# Patient Record
Sex: FEMALE | Race: WHITE | Employment: OTHER | URBAN - METROPOLITAN AREA
[De-identification: names, ages, dates, MRNs, and addresses within clinical notes are randomized per-mention and may not be internally consistent; named-entity substitution may affect disease eponyms.]

---

## 2022-07-20 ENCOUNTER — ANESTHESIA (OUTPATIENT)
Dept: OPERATING ROOM | Age: 62
End: 2022-07-20
Payer: COMMERCIAL

## 2022-07-20 ENCOUNTER — HOSPITAL ENCOUNTER (OUTPATIENT)
Dept: GENERAL RADIOLOGY | Age: 62
Discharge: HOME | End: 2022-07-22
Attending: ORTHOPAEDIC SURGERY
Payer: COMMERCIAL

## 2022-07-20 ENCOUNTER — ANESTHESIA EVENT (OUTPATIENT)
Dept: OPERATING ROOM | Age: 62
End: 2022-07-20
Payer: COMMERCIAL

## 2022-07-20 ENCOUNTER — HOSPITAL ENCOUNTER (OUTPATIENT)
Age: 62
Setting detail: OUTPATIENT SURGERY
Discharge: HOME | End: 2022-07-20
Attending: ORTHOPAEDIC SURGERY | Admitting: ORTHOPAEDIC SURGERY
Payer: COMMERCIAL

## 2022-07-20 VITALS
OXYGEN SATURATION: 94 % | DIASTOLIC BLOOD PRESSURE: 66 MMHG | HEART RATE: 74 BPM | WEIGHT: 130 LBS | BODY MASS INDEX: 24.55 KG/M2 | TEMPERATURE: 97.4 F | SYSTOLIC BLOOD PRESSURE: 123 MMHG | HEIGHT: 61 IN | RESPIRATION RATE: 16 BRPM

## 2022-07-20 DIAGNOSIS — S52.502A CLOSED FRACTURE OF DISTAL END OF LEFT RADIUS, UNSPECIFIED FRACTURE MORPHOLOGY, INITIAL ENCOUNTER: ICD-10-CM

## 2022-07-20 DIAGNOSIS — R52 PAIN: ICD-10-CM

## 2022-07-20 DIAGNOSIS — G56.02 LEFT CARPAL TUNNEL SYNDROME: ICD-10-CM

## 2022-07-20 LAB
ALBUMIN SERPL-MCNC: 4.9 G/DL (ref 3.5–4.6)
ALP BLD-CCNC: 141 U/L (ref 40–130)
ALT SERPL-CCNC: 13 U/L (ref 0–33)
ANION GAP SERPL CALCULATED.3IONS-SCNC: 15 MEQ/L (ref 9–15)
AST SERPL-CCNC: 18 U/L (ref 0–35)
BILIRUB SERPL-MCNC: 0.7 MG/DL (ref 0.2–0.7)
BUN BLDV-MCNC: 26 MG/DL (ref 8–23)
CALCIUM SERPL-MCNC: 10 MG/DL (ref 8.5–9.9)
CHLORIDE BLD-SCNC: 100 MEQ/L (ref 95–107)
CO2: 23 MEQ/L (ref 20–31)
CREAT SERPL-MCNC: 0.69 MG/DL (ref 0.5–0.9)
GFR AFRICAN AMERICAN: >60
GFR NON-AFRICAN AMERICAN: >60
GLOBULIN: 2.5 G/DL (ref 2.3–3.5)
GLUCOSE BLD-MCNC: 83 MG/DL (ref 70–99)
HCT VFR BLD CALC: 41.7 % (ref 37–47)
HEMOGLOBIN: 14.1 G/DL (ref 12–16)
MCH RBC QN AUTO: 30 PG (ref 27–31.3)
MCHC RBC AUTO-ENTMCNC: 33.8 % (ref 33–37)
MCV RBC AUTO: 88.6 FL (ref 82–100)
PDW BLD-RTO: 13.9 % (ref 11.5–14.5)
PLATELET # BLD: 314 K/UL (ref 130–400)
POTASSIUM SERPL-SCNC: 4.3 MEQ/L (ref 3.4–4.9)
RBC # BLD: 4.71 M/UL (ref 4.2–5.4)
SODIUM BLD-SCNC: 138 MEQ/L (ref 135–144)
TOTAL PROTEIN: 7.4 G/DL (ref 6.3–8)
WBC # BLD: 6.2 K/UL (ref 4.8–10.8)

## 2022-07-20 PROCEDURE — 2580000003 HC RX 258: Performed by: STUDENT IN AN ORGANIZED HEALTH CARE EDUCATION/TRAINING PROGRAM

## 2022-07-20 PROCEDURE — 2720000010 HC SURG SUPPLY STERILE: Performed by: ORTHOPAEDIC SURGERY

## 2022-07-20 PROCEDURE — 88342 IMHCHEM/IMCYTCHM 1ST ANTB: CPT

## 2022-07-20 PROCEDURE — 64417 NJX AA&/STRD AX NERVE IMG: CPT | Performed by: STUDENT IN AN ORGANIZED HEALTH CARE EDUCATION/TRAINING PROGRAM

## 2022-07-20 PROCEDURE — 3700000001 HC ADD 15 MINUTES (ANESTHESIA): Performed by: ORTHOPAEDIC SURGERY

## 2022-07-20 PROCEDURE — 2500000003 HC RX 250 WO HCPCS: Performed by: STUDENT IN AN ORGANIZED HEALTH CARE EDUCATION/TRAINING PROGRAM

## 2022-07-20 PROCEDURE — 93005 ELECTROCARDIOGRAM TRACING: CPT | Performed by: ORTHOPAEDIC SURGERY

## 2022-07-20 PROCEDURE — 2580000003 HC RX 258: Performed by: NURSE ANESTHETIST, CERTIFIED REGISTERED

## 2022-07-20 PROCEDURE — 2709999900 HC NON-CHARGEABLE SUPPLY: Performed by: ORTHOPAEDIC SURGERY

## 2022-07-20 PROCEDURE — 3700000000 HC ANESTHESIA ATTENDED CARE: Performed by: ORTHOPAEDIC SURGERY

## 2022-07-20 PROCEDURE — 7100000001 HC PACU RECOVERY - ADDTL 15 MIN: Performed by: ORTHOPAEDIC SURGERY

## 2022-07-20 PROCEDURE — 6360000002 HC RX W HCPCS: Performed by: ORTHOPAEDIC SURGERY

## 2022-07-20 PROCEDURE — 88311 DECALCIFY TISSUE: CPT

## 2022-07-20 PROCEDURE — 88305 TISSUE EXAM BY PATHOLOGIST: CPT

## 2022-07-20 PROCEDURE — 88341 IMHCHEM/IMCYTCHM EA ADD ANTB: CPT

## 2022-07-20 PROCEDURE — 7100000000 HC PACU RECOVERY - FIRST 15 MIN: Performed by: ORTHOPAEDIC SURGERY

## 2022-07-20 PROCEDURE — A4217 STERILE WATER/SALINE, 500 ML: HCPCS | Performed by: ORTHOPAEDIC SURGERY

## 2022-07-20 PROCEDURE — 3600000013 HC SURGERY LEVEL 3 ADDTL 15MIN: Performed by: ORTHOPAEDIC SURGERY

## 2022-07-20 PROCEDURE — 7100000010 HC PHASE II RECOVERY - FIRST 15 MIN: Performed by: ORTHOPAEDIC SURGERY

## 2022-07-20 PROCEDURE — 6360000002 HC RX W HCPCS: Performed by: STUDENT IN AN ORGANIZED HEALTH CARE EDUCATION/TRAINING PROGRAM

## 2022-07-20 PROCEDURE — 3209999900 FLUORO FOR SURGICAL PROCEDURES

## 2022-07-20 PROCEDURE — C1713 ANCHOR/SCREW BN/BN,TIS/BN: HCPCS | Performed by: ORTHOPAEDIC SURGERY

## 2022-07-20 PROCEDURE — 3600000003 HC SURGERY LEVEL 3 BASE: Performed by: ORTHOPAEDIC SURGERY

## 2022-07-20 PROCEDURE — 6360000002 HC RX W HCPCS: Performed by: NURSE ANESTHETIST, CERTIFIED REGISTERED

## 2022-07-20 PROCEDURE — 85027 COMPLETE CBC AUTOMATED: CPT

## 2022-07-20 PROCEDURE — 80053 COMPREHEN METABOLIC PANEL: CPT

## 2022-07-20 PROCEDURE — 2580000003 HC RX 258: Performed by: ORTHOPAEDIC SURGERY

## 2022-07-20 PROCEDURE — 7100000011 HC PHASE II RECOVERY - ADDTL 15 MIN: Performed by: ORTHOPAEDIC SURGERY

## 2022-07-20 DEVICE — SCREW BNE L14MM DIA2.4MM DST RAD VOLAR S STL ST VAR ANG LOK: Type: IMPLANTABLE DEVICE | Site: RADIUS | Status: FUNCTIONAL

## 2022-07-20 DEVICE — K WIRE FIX L150MM DIA1.6MM S STL TRCR PNT: Type: IMPLANTABLE DEVICE | Site: RADIUS | Status: FUNCTIONAL

## 2022-07-20 DEVICE — SCREW BNE L10MM DIA2.4MM S STL ST VAR ANG LOK FULL THRD T8: Type: IMPLANTABLE DEVICE | Site: WRIST | Status: FUNCTIONAL

## 2022-07-20 DEVICE — PLATE BNE L45MM 6X2 H L VOLAR DST RAD S STL VAR ANG LOK: Type: IMPLANTABLE DEVICE | Site: RADIUS | Status: FUNCTIONAL

## 2022-07-20 DEVICE — SCREW BNE L18MM DIA2.4MM DST RAD VOLAR S STL ST VAR ANG LOK: Type: IMPLANTABLE DEVICE | Site: WRIST | Status: FUNCTIONAL

## 2022-07-20 DEVICE — SCREW BNE L18MM DIA2.4MM S STL ST LOK FULL THRD T8 STARDRV: Type: IMPLANTABLE DEVICE | Site: WRIST | Status: FUNCTIONAL

## 2022-07-20 DEVICE — IMPLANTABLE DEVICE: Type: IMPLANTABLE DEVICE | Site: WRIST | Status: FUNCTIONAL

## 2022-07-20 DEVICE — SCREW BNE L16MM DIA2.4MM DST RAD VOLAR S STL ST VAR ANG LOK: Type: IMPLANTABLE DEVICE | Site: RADIUS | Status: FUNCTIONAL

## 2022-07-20 DEVICE — SCREW BNE L12MM DIA24MM CORT S STL ST T8 STARDRV RECESS: Type: IMPLANTABLE DEVICE | Site: RADIUS | Status: FUNCTIONAL

## 2022-07-20 RX ORDER — SIMVASTATIN 40 MG
40 TABLET ORAL NIGHTLY
COMMUNITY

## 2022-07-20 RX ORDER — BUPIVACAINE HYDROCHLORIDE 5 MG/ML
INJECTION, SOLUTION EPIDURAL; INTRACAUDAL
Status: COMPLETED | OUTPATIENT
Start: 2022-07-20 | End: 2022-07-20

## 2022-07-20 RX ORDER — SODIUM CHLORIDE, SODIUM LACTATE, POTASSIUM CHLORIDE, CALCIUM CHLORIDE 600; 310; 30; 20 MG/100ML; MG/100ML; MG/100ML; MG/100ML
INJECTION, SOLUTION INTRAVENOUS CONTINUOUS PRN
Status: DISCONTINUED | OUTPATIENT
Start: 2022-07-20 | End: 2022-07-20 | Stop reason: SDUPTHER

## 2022-07-20 RX ORDER — VENLAFAXINE 100 MG/1
100 TABLET ORAL 2 TIMES DAILY
COMMUNITY

## 2022-07-20 RX ORDER — MAGNESIUM HYDROXIDE 1200 MG/15ML
LIQUID ORAL CONTINUOUS PRN
Status: DISCONTINUED | OUTPATIENT
Start: 2022-07-20 | End: 2022-07-20 | Stop reason: HOSPADM

## 2022-07-20 RX ORDER — ONDANSETRON 2 MG/ML
INJECTION INTRAMUSCULAR; INTRAVENOUS PRN
Status: DISCONTINUED | OUTPATIENT
Start: 2022-07-20 | End: 2022-07-20 | Stop reason: SDUPTHER

## 2022-07-20 RX ORDER — METOCLOPRAMIDE HYDROCHLORIDE 5 MG/ML
10 INJECTION INTRAMUSCULAR; INTRAVENOUS
Status: DISCONTINUED | OUTPATIENT
Start: 2022-07-20 | End: 2022-07-20 | Stop reason: HOSPADM

## 2022-07-20 RX ORDER — SODIUM CHLORIDE, SODIUM LACTATE, POTASSIUM CHLORIDE, CALCIUM CHLORIDE 600; 310; 30; 20 MG/100ML; MG/100ML; MG/100ML; MG/100ML
INJECTION, SOLUTION INTRAVENOUS CONTINUOUS
Status: DISCONTINUED | OUTPATIENT
Start: 2022-07-20 | End: 2022-07-20 | Stop reason: HOSPADM

## 2022-07-20 RX ORDER — OXYCODONE HYDROCHLORIDE 5 MG/1
5 TABLET ORAL PRN
Status: DISCONTINUED | OUTPATIENT
Start: 2022-07-20 | End: 2022-07-20 | Stop reason: HOSPADM

## 2022-07-20 RX ORDER — LIDOCAINE HYDROCHLORIDE 10 MG/ML
1 INJECTION, SOLUTION EPIDURAL; INFILTRATION; INTRACAUDAL; PERINEURAL
Status: DISCONTINUED | OUTPATIENT
Start: 2022-07-20 | End: 2022-07-20 | Stop reason: HOSPADM

## 2022-07-20 RX ORDER — OXYCODONE AND ACETAMINOPHEN 10; 325 MG/1; MG/1
1 TABLET ORAL EVERY 4 HOURS PRN
COMMUNITY

## 2022-07-20 RX ORDER — LIDOCAINE HYDROCHLORIDE 10 MG/ML
1 INJECTION, SOLUTION EPIDURAL; INFILTRATION; INTRACAUDAL; PERINEURAL ONCE
Status: DISCONTINUED | OUTPATIENT
Start: 2022-07-20 | End: 2022-07-20 | Stop reason: HOSPADM

## 2022-07-20 RX ORDER — DIPHENHYDRAMINE HYDROCHLORIDE 50 MG/ML
12.5 INJECTION INTRAMUSCULAR; INTRAVENOUS
Status: DISCONTINUED | OUTPATIENT
Start: 2022-07-20 | End: 2022-07-20 | Stop reason: HOSPADM

## 2022-07-20 RX ORDER — MIDAZOLAM HYDROCHLORIDE 1 MG/ML
INJECTION INTRAMUSCULAR; INTRAVENOUS PRN
Status: DISCONTINUED | OUTPATIENT
Start: 2022-07-20 | End: 2022-07-20 | Stop reason: SDUPTHER

## 2022-07-20 RX ORDER — OXYCODONE HYDROCHLORIDE 5 MG/1
10 TABLET ORAL PRN
Status: DISCONTINUED | OUTPATIENT
Start: 2022-07-20 | End: 2022-07-20 | Stop reason: HOSPADM

## 2022-07-20 RX ORDER — FENTANYL CITRATE 50 UG/ML
50 INJECTION, SOLUTION INTRAMUSCULAR; INTRAVENOUS EVERY 10 MIN PRN
Status: DISCONTINUED | OUTPATIENT
Start: 2022-07-20 | End: 2022-07-20 | Stop reason: HOSPADM

## 2022-07-20 RX ORDER — FENTANYL CITRATE 50 UG/ML
INJECTION, SOLUTION INTRAMUSCULAR; INTRAVENOUS PRN
Status: DISCONTINUED | OUTPATIENT
Start: 2022-07-20 | End: 2022-07-20 | Stop reason: SDUPTHER

## 2022-07-20 RX ORDER — MEPERIDINE HYDROCHLORIDE 25 MG/ML
12.5 INJECTION INTRAMUSCULAR; INTRAVENOUS; SUBCUTANEOUS
Status: DISCONTINUED | OUTPATIENT
Start: 2022-07-20 | End: 2022-07-20 | Stop reason: HOSPADM

## 2022-07-20 RX ORDER — DEXAMETHASONE SODIUM PHOSPHATE 10 MG/ML
INJECTION INTRAMUSCULAR; INTRAVENOUS PRN
Status: DISCONTINUED | OUTPATIENT
Start: 2022-07-20 | End: 2022-07-20 | Stop reason: SDUPTHER

## 2022-07-20 RX ORDER — LIDOCAINE HYDROCHLORIDE 20 MG/ML
INJECTION, SOLUTION INTRAVENOUS PRN
Status: DISCONTINUED | OUTPATIENT
Start: 2022-07-20 | End: 2022-07-20 | Stop reason: SDUPTHER

## 2022-07-20 RX ORDER — ONDANSETRON 2 MG/ML
4 INJECTION INTRAMUSCULAR; INTRAVENOUS
Status: DISCONTINUED | OUTPATIENT
Start: 2022-07-20 | End: 2022-07-20 | Stop reason: HOSPADM

## 2022-07-20 RX ORDER — PROPOFOL 10 MG/ML
INJECTION, EMULSION INTRAVENOUS PRN
Status: DISCONTINUED | OUTPATIENT
Start: 2022-07-20 | End: 2022-07-20 | Stop reason: SDUPTHER

## 2022-07-20 RX ADMIN — LIDOCAINE HYDROCHLORIDE 50 MG: 20 INJECTION, SOLUTION INTRAVENOUS at 12:13

## 2022-07-20 RX ADMIN — DEXAMETHASONE SODIUM PHOSPHATE 10 MG: 10 INJECTION INTRAMUSCULAR; INTRAVENOUS at 12:38

## 2022-07-20 RX ADMIN — MIDAZOLAM HYDROCHLORIDE 2 MG: 1 INJECTION, SOLUTION INTRAMUSCULAR; INTRAVENOUS at 12:10

## 2022-07-20 RX ADMIN — MIDAZOLAM HYDROCHLORIDE 2 MG: 1 INJECTION, SOLUTION INTRAMUSCULAR; INTRAVENOUS at 11:16

## 2022-07-20 RX ADMIN — PROPOFOL 200 MG: 10 INJECTION, EMULSION INTRAVENOUS at 12:13

## 2022-07-20 RX ADMIN — SODIUM CHLORIDE, POTASSIUM CHLORIDE, SODIUM LACTATE AND CALCIUM CHLORIDE: 600; 310; 30; 20 INJECTION, SOLUTION INTRAVENOUS at 11:47

## 2022-07-20 RX ADMIN — FENTANYL CITRATE 25 MCG: 50 INJECTION, SOLUTION INTRAMUSCULAR; INTRAVENOUS at 13:09

## 2022-07-20 RX ADMIN — ONDANSETRON 4 MG: 2 INJECTION INTRAMUSCULAR; INTRAVENOUS at 13:53

## 2022-07-20 RX ADMIN — BUPIVACAINE HYDROCHLORIDE 30 ML: 5 INJECTION, SOLUTION EPIDURAL; INTRACAUDAL at 11:18

## 2022-07-20 RX ADMIN — SODIUM CHLORIDE, POTASSIUM CHLORIDE, SODIUM LACTATE AND CALCIUM CHLORIDE: 600; 310; 30; 20 INJECTION, SOLUTION INTRAVENOUS at 11:00

## 2022-07-20 RX ADMIN — FENTANYL CITRATE 50 MCG: 50 INJECTION, SOLUTION INTRAMUSCULAR; INTRAVENOUS at 12:13

## 2022-07-20 RX ADMIN — CEFAZOLIN 2000 MG: 10 INJECTION, POWDER, FOR SOLUTION INTRAVENOUS at 12:16

## 2022-07-20 ASSESSMENT — PAIN - FUNCTIONAL ASSESSMENT
PAIN_FUNCTIONAL_ASSESSMENT: 0-10
PAIN_FUNCTIONAL_ASSESSMENT: ACTIVITIES ARE NOT PREVENTED

## 2022-07-20 ASSESSMENT — PAIN DESCRIPTION - DESCRIPTORS: DESCRIPTORS: ACHING

## 2022-07-20 NOTE — ANESTHESIA PROCEDURE NOTES
Peripheral Block    Patient location during procedure: pre-op  Reason for block: post-op pain management and at surgeon's request  Start time: 7/20/2022 11:18 AM  End time: 7/20/2022 11:24 AM  Staffing  Performed: anesthesiologist   Anesthesiologist: Orlando Krabbe, DO  Preanesthetic Checklist  Completed: patient identified, IV checked, site marked, risks and benefits discussed, surgical/procedural consents, equipment checked, pre-op evaluation, timeout performed, anesthesia consent given, oxygen available and monitors applied/VS acknowledged  Peripheral Block   Patient position: supine  Prep: ChloraPrep  Provider prep: mask and sterile gloves (Sterile probe cover)  Patient monitoring: cardiac monitor, continuous pulse ox, frequent blood pressure checks and IV access  Block type: Axillary  L axillary  Laterality: left  Injection technique: single-shot  Guidance: nerve stimulator and ultrasound guided  Local infiltration: bupivacaine  Infiltration strength: 0.5 %  Local infiltration: bupivacaine  Dose: 30 mL    Needle   Needle type: combined needle/nerve stimulator   Needle gauge: 22 G  Needle localization: anatomical landmarks, ultrasound guidance and nerve stimulator  Needle length: 10 cm  Assessment   Injection assessment: negative aspiration for heme, no paresthesia on injection and local visualized surrounding nerve on ultrasound  Paresthesia pain: immediately resolved  Slow fractionated injection: yes  Hemodynamics: stable  Real-time US image taken/store: yes    Additional Notes  Ultrasound image printed and saved in patient chart.     Sterile probe cover used    Medications Administered  bupivacaine (PF) 0.5 % - Perineural   30 mL - 7/20/2022 11:18:00 AM

## 2022-07-20 NOTE — ANESTHESIA POSTPROCEDURE EVALUATION
Department of Anesthesiology  Postprocedure Note    Patient: Herb Bhat  MRN: [id number]  YOB: 1960  Date of evaluation: 7/20/2022      Procedure Summary     Date: 07/20/22 Room / Location: [address]    Anesthesia Start: 1210 Anesthesia Stop: 1997    Procedure: OPEN REDUCTION INTERNAL FIXATION LEFT DISTAL RADIUS FRACTURE WITH OPEN SCAPULUNATE LIGAMENT REPAIR, OPEN BIOPSY OF LEFT ULNA (Left: Wrist) Diagnosis:       Left carpal tunnel syndrome      Closed fracture of distal end of left radius, unspecified fracture morphology, initial encounter      (LEFT CTS, LT DISTAL RADIUS FX, LEFT WRIST SCAPOLUNATE LIGAMENT DISRUPTION LEFT ULNA BONE LESION)    Surgeons: Kristian Ulloa DO Responsible Provider: Cade Phipps DO    Anesthesia Type: general, regional ASA Status: 1          Anesthesia Type: No value filed.     Mei Phase I: Mei Score: 10    Mei Phase II:        Anesthesia Post Evaluation    Patient location during evaluation: PACU  Patient participation: complete - patient participated  Level of consciousness: awake  Pain score: 0  Airway patency: patent  Nausea & Vomiting: no nausea and no vomiting  Complications: no  Cardiovascular status: hemodynamically stable  Respiratory status: acceptable  Hydration status: euvolemic

## 2022-07-20 NOTE — OP NOTE
Operative Note      Patient: Beth Ricardo  YOB: 1960  MRN: [id number]    Date of Procedure: 7/20/2022      Preoperative diagnosis: Comminuted intra-articular fracture left distal radius. Left wrist scapholunate ligament disruption. Lesion left distal ulna. Postoperative diagnosis: Comminuted intra-articular fracture left distal radius. Left wrist scapholunate ligament rupture, partial.  Lesion of left distal ulna. Procedure planned: Open treatment left distal radius fracture. Open biopsy left distal ulna. Possible scapholunate ligament repair left wrist.    Procedure performed:Open reduction with internal fixation to comminuted intra-articular fracture left distal radius, 3+ part. Left wrist denervation procedure with excision of terminal branch of posterior interosseous nerve for purposes of long-term pain control. Open repair left wrist scapholunate ligament disruption. Biopsy of bone left distal ulna. Surgeon: Mami Mosqueda D.O. Assistant: PEYTON Naqvi  The physician assistant was present through the entire case. Given the nature of the disease process and the procedure to be performed a skilled surgical assistant was necessary during the case. The assistant was necessary in order to hold retractors and directly assist in the operation. A certified scrub tech was at the back table managing instruments and supplies for the surgical case. Anesthesia: Regional block plus general anesthesia    Estimated blood loss: Less than 50 cc. Drains: None    Tourniquet: Less than 1 hour at 250 mmHg to the well-padded left upper arm    Specimens: 2 separate bone plugs harvested with Jamshidi needle were taken from the distal ulna and sent for pathology    Implants: None    Indications:  The patient presented to the office status post injury to the left wrist.  X-rays and CAT scan demonstrated comminuted intra-articular fracture of left distal radius with widening of scapholunate interval.  It was incidental finding of lucent lesion in the distal ulna noted on CAT scan. Treatment options were discussed including both operative and nonoperative strategies. The patient elected to proceed forth by way of open treatment of the distal radius fracture with possible scapholunate ligament repair with biopsy of bone of distal ulna and procedures as indicated. Informed consent was signed and placed in the chart. Complications: None noted at the time of surgery     Description of operation: The patient was taken to the operative suite and placed in the supine position on the operating table. A timeout was performed and the left wrist confirmed to be the operative site. She was carefully positioned on the table in such a fashion as to pad all bony prominences and peripheral nerves. She was administered appropriate IV antibiotics and general anesthesia augment the regional block placed in the preoperative holding area. She was prepped and draped in the normal sterile fashion. A 15 blade was used to create a dorsal midline wrist incision as our initial intention was for a dorsal plating alone construct. The dorsal approach would allow for exposure of the scapholunate ligament. The 15 blade was used to incise skin. Dissection was carried through the subcutaneous plane elevating full-thickness subcutaneous flaps off of the dorsal wrist extensor retinaculum. The third dorsal compartment was then opened and the EPL tendon reflected radially. The second and fourth dorsal compartments were opened and there tendons retracted both radially and ulnarly. There was obvious dorsal comminution about the distal radius. A 15 blade was used to incise the dorsal wrist capsule to allow for direct inspection of the scapholunate ligament. There was obvious partial disruption of the scapholunate ligament including the most inferior and palmar aspects.   The dorsal band was only partially ruptured. There was a hemorrhagic change within this zone confirming acuteness of injury. Given the complexity of the intra-articular distal radius fracture and the obvious scapholunate ligament disruption it was deemed most appropriate to perform denervation procedure for purposes of long-term pain control. The 15 blade was used to excise the distal 1.5 cm of the posterior interosseous nerve. Again this was done for purposes of denervation and long-term pain control. With the aid of fluoroscopy manipulation was undertaken to the distal radius. With a dorsal to volar pressure we were able to achieve good restoration of the combination and articular congruence dorsally but the articular block was noted to translate volarly indicating dorsal plating alone to be insufficient. It was deemed most appropriate to perform a dual approach. The 15 blade was then used to incise skin over the distal course of the flexor carpi radialis tendon. Dissection was carried through the subcutaneous plane and tenotomy scissors. The the volar sheath of the FCR tendon was incised. The FCR tendon was retracted ulnarly and the 15 blade used to incise the dorsal sheath. A finger sweep maneuver was used to clear the FPL muscle belly and fat plane exposing the pronator quadratus. The pronator was sharply released off of its attachment to the distal radius. The appropriate plate was then chosen from the [employer] set and coupled to bone in the shaft segment. Reduction maneuvers were performed to allow good alignment of the volar cortex prior to the plating. A series of lock screws were then placed in the distal segment ensuring that the screws stayed short of their typical length such that they would not serve as an impediment to reduction for the dorsal fragments. We were unable to simultaneously control the profound dorsal comminution through a volar approach and plating alone.   A second cortical screw was placed in the shaft segment finalizing the volar portion of the construct and attention turned again dorsally. The appropriate dorsal plate was coupled to bone with a bicortical screw. 3 additional locking screws were placed in the shaft segment of the plate designed to sit in the intermediate column. This allowed for nice compression of the articular comminution and restoration of articular congruence. The number of articular fragments numbered much more than 3. Attention was then turned to the scapholunate ligament. The scapholunate interval was reduced and compressed with a bone reduction forceps. While holding this reduction a 6 2 K wire was started in the radial aspect of the scaphoid waist and delivered from radial to ulnar and slightly from distal to proximal traversing the scapholunate interval and anchoring bicortically into the lunate. In a similar fashion a second pin was placed at this time with a slightly more proximal and dorsal start point and delivered in a similar trajectory again traversing the scaphoid bicortical into the scapholunate interval and anchoring bicortically into the lunate. These scapholunate ligament pins were cut in a subcutaneous position. Irrigation was performed both the dorsal and volar wounds. The dorsal wounds were then closed using 0 Vicryl for the dorsal wrist capsule and for the dorsal wrist extensor retinaculum. The EPL tendon was left superficial to the retinacular closure. The tourniquet was relieved and hemostasis confirmed both dorsally and volarly. Interrupted Vicryl sutures were used to close the subcutaneous plane and nylon stitches for the skin. Attention was then turned to open biopsy of the distal ulna. The 15 blade was used to incise skin over the palpable distal ulna exploiting the interval between ECU and FCU. Dissection was carried down to the level of the ulnar neck.   Standard techniques were used to place a Jamshidi needle through the zone of interest demonstrated on CAT scan. 2 separate bone plugs were harvested from the distal ulna and sent for pathology. Irrigation was performed to the distal ulna wound and hemostasis confirmed. Wounds were closed in a layered fashion using Vicryl and nylon suture. The patient was then placed into a bulky soft nonadherent dressing and splint. She was allowed to arise from general anesthesia and taken recovery in stable condition. Overall the patient tolerated the procedure well. Disposition: Stable to PACU           Specimens:   ID Type Source Tests Collected by Time Destination   A : Left distal ulna biopsy Tissue Arm SURGICAL PATHOLOGY Duke Peck, DO 7/20/2022 1348        Implants:  Implant Name Type Inv. Item Serial No.  Lot No. LRB No. Used Action   SCREW BNE L14MM DIA2.4MM DST RAD VOLAR S STL ST ANJUM ANG JOSUÉ - [id number]  SCREW BNE L14MM DIA2.4MM DST RAD VOLAR S STL ST ANJUM ANG JOSUÉ  [employer]  Left 3 Implanted   SCREW BNE L16MM DIA2.4MM DST RAD VOLAR S STL ST ANJUM ANG JOSUÉ - [id number]  SCREW BNE L16MM DIA2.4MM DST RAD VOLAR S STL ST ANJUM ANG JOSUÉ  [employer]  Left 1 Implanted   SCREW BNE L12MM [id number] LUZ MARIA S STL ST T8 STARDRV RECESS - [id number]  SCREW BNE L12MM [id number] LUZ MARIA S STL ST T8 STARDRV RECESS  [employer]  Left 3 Implanted   PLATE BNE I82AH 6X2 H L VOLAR DST RAD S STL ANJUM ANG JOSUÉ - [id number]  PLATE BNE [id number] 6X2 H L VOLAR DST RAD S STL ANJUM ANG JOSUÉ  [employer]  Left 1 Implanted   K WIRE FIX L150MM DIA1. 6MM S STL TRCR PNT - [id number]  K WIRE FIX L150MM DIA1. 6MM S STL TRCR PNT  [employer]  Left 1 Implanted   SCREW BNE L10MM DIA2. 4MM S STL ST ANJUM ANG JOSUÉ FULL THRD T8 - [id number]  SCREW BNE L10MM DIA2. 4MM S STL ST ANJUM ANG JOSUÉ FULL THRD T8  DEPU[employer]  Left 2 Implanted   SCREW BNE L18MM DIA2.4MM DST RAD VOLAR S STL ST ANJUM ANG JOSUÉ - [id number]  SCREW BNE L18MM DIA2.4MM DST RAD VOLAR S STL ST ANJUM ANG JOSUÉ  DEPUY SYNTHES [facility]-WD  Left 1 Implanted   SCREW BNE L18MM DIA2. 4MM S STL ST JOSUÉ FULL THRD T8 STARDRV - [id number]  SCREW BNE L18MM DIA2. 4MM S STL ST JOSUÉ FULL THRD T8 STARDRV  DEPU[employer]  Left 1 Implanted         Electronically signed by Franki Buck DO on 7/20/2022 at 2:00 PM

## 2022-07-20 NOTE — ANESTHESIA PRE PROCEDURE
Department of Anesthesiology  Preprocedure Note       Name:  Edward Hdz   Age:  65 y.o.  :  1960                                          MRN:  [id number]         Date:  2022      Surgeon: Juan F Swift):  Genesis Wilde DO    Procedure: Procedure(s):  LEFT CARPAL TUNNEL RELEASE  OPEN REDUCTION INTERNAL FIXATION LEFT DISTAL RADIUS FRACTURE WITH POSSIBLE OPEN SCAPULUNATE LIGAMENT REPAIR, OPEN BIOPSY OF LEFT ULNA SUPINE, C-ARM; SYNTHES DISTAL RADIUS PLATES; CURETTES-NATALIIA    Medications prior to admission:   Prior to Admission medications    Medication Sig Start Date End Date Taking? Authorizing Provider   venlafaxine (EFFEXOR) 100 MG tablet Take 100 mg by mouth 2 times daily at 0800 and 1400   Yes Historical Provider, MD   oxyCODONE-acetaminophen (PERCOCET)  MG per tablet Take 1 tablet by mouth every 4 hours as needed for Pain. Yes Historical Provider, MD   simvastatin (ZOCOR) 40 MG tablet Take 40 mg by mouth nightly   Yes Historical Provider, MD       Current medications:    Current Facility-Administered Medications   Medication Dose Route Frequency Provider Last Rate Last Admin    ceFAZolin (ANCEF) 2000 mg in dextrose 5 % 100 mL IVPB  2,000 mg IntraVENous Once Panda Philippe DO        lactated ringers infusion   IntraVENous Continuous Joey Rudolph DO        lidocaine PF 1 % injection 1 mL  1 mL IntraDERmal Once Renato Randolph DO           Allergies: Allergies   Allergen Reactions    Sulfa Antibiotics Itching and Rash       Problem List:  There is no problem list on file for this patient. Past Medical History:  No past medical history on file. Past Surgical History:  No past surgical history on file.     Social History:    Social History     Tobacco Use    Smoking status: Not on file    Smokeless tobacco: Not on file   Substance Use Topics    Alcohol use: Not on file                                Counseling given: Not Answered      Vital Signs (Current):   Vitals: 07/20/22 1000 07/20/22 1009   BP: 126/78    Pulse:  84   Resp: 18    Temp: 97 °F (36.1 °C)    TempSrc: Temporal    SpO2:  97%   Weight: 130 lb (59 kg)    Height: 5' 1\" (1.549 m)                                               BP Readings from Last 3 Encounters:   07/20/22 126/78       NPO Status: Time of last liquid consumption: 0730                        Time of last solid consumption: 2200                        Date of last liquid consumption: 07/20/22                        Date of last solid food consumption: 07/19/22    BMI:   Wt Readings from Last 3 Encounters:   07/20/22 130 lb (59 kg)     Body mass index is 24.56 kg/m². CBC: No results found for: WBC, RBC, HGB, HCT, MCV, RDW, PLT    CMP: No results found for: NA, K, CL, CO2, BUN, CREATININE, GFRAA, AGRATIO, LABGLOM, GLUCOSE, GLU, PROT, CALCIUM, BILITOT, ALKPHOS, AST, ALT    POC Tests: No results for input(s): POCGLU, POCNA, POCK, POCCL, POCBUN, POCHEMO, POCHCT in the last 72 hours.     Coags: No results found for: PROTIME, INR, APTT    HCG (If Applicable): No results found for: PREGTESTUR, PREGSERUM, HCG, HCGQUANT     ABGs: No results found for: PHART, PO2ART, KWA8DGT, NGY8GEF, BEART, O3WAQNWK     Type & Screen (If Applicable):  No results found for: LABABO, LABRH    Drug/Infectious Status (If Applicable):  No results found for: HIV, HEPCAB    COVID-19 Screening (If Applicable): No results found for: COVID19        Anesthesia Evaluation  Patient summary reviewed and Nursing notes reviewed no history of anesthetic complications:   Airway: Mallampati: II  TM distance: >3 FB   Neck ROM: full  Mouth opening: > = 3 FB   Dental: normal exam         Pulmonary:Negative Pulmonary ROS and normal exam                               Cardiovascular:Negative CV ROS  Exercise tolerance: good (>4 METS),         ECG reviewed               Beta Blocker:  Not on Beta Blocker         Neuro/Psych:   Negative Neuro/Psych ROS              GI/Hepatic/Renal: Neg GI/Hepatic/Renal ROS            Endo/Other: Negative Endo/Other ROS             Pt had PAT visit. Abdominal:             Vascular: negative vascular ROS. Other Findings:           Anesthesia Plan      general and regional     ASA 1     (LMA  Axillary)  Induction: intravenous. MIPS: Postoperative opioids intended and Prophylactic antiemetics administered. Anesthetic plan and risks discussed with patient. Plan discussed with CRNA.     Attending anesthesiologist reviewed and agrees with Pre Eval content      Post-op pain plan if not by surgeon: single peripheral nerve block            Cade Pihpps, DO   7/20/2022

## 2022-07-20 NOTE — PROGRESS NOTES
Pt to block room. Monitors and O2 @ 3L/min per nasal cannula applied. Dr. Tobias Rockwell here to perform an axillary block to pt. Timeout done and sedation given. Pt tolerated procedure well. RH

## 2022-07-20 NOTE — DISCHARGE INSTRUCTIONS
pain. Keep in mind that this may occur in any order. Once a nerve block starts to wear off it is usually completely gone within 60 minutes. Certain nerve blocks may cause other symptoms. If you have had a shoulder block or a block near your collar bone, you may have symptoms such as:  mild shortness of breath   a hoarse voice   blurry vision   unequal pupils   drooping of your face on the same side as the nerve block   Swelling at site of neck where block was placed   These are common side effects of this type of nerve block. These symptoms usually go away within 12-24 hours. If you have severe or prolonged shortness of breath, please go to the nearest Emergency Room  If you continue to feel the effects of the nerve block for longer than 48 hours, please call [facility] [phone] and ask to speak with the Anesthetist on call. Protection of a Numb Arm or Leg  After a nerve block, you cannot feel pain, pressure, or extremes in temperature in the effected limb. Because your arm or leg is numb it is at risk for injury. For example, it is possible to burn your numb arm or leg on a hot stove without knowing it. Here are some helpful tips to protect your arm or leg while it is numb: While you are awake change position of your arm or leg often. This helps to avoid putting too much pressure on the limb for long periods of time. While sleeping, pad the limb with pillows to avoid rolling onto it while you sleep. If you have had a shoulder or arm block, it is a good idea to sleep in a recliner with pillows under your arm to avoid rolling onto your numb arm as you sleep. If you have a cast or tight dressing, check the color of your fingers/toes every couple of hours. Call your surgeon if any look discolored. If you have had a shoulder, arm, or hand block, you may go home with a sling. The sling will help to keep your arm in a safe position.  Wear the sling at all times until the nerve block completely wears off.   If you have had a leg block, you may have difficulty bearing weight on that leg. You may be sent home with crutches to use until the nerve block wears ff. Have someone assist you with walking until the nerve block completely wears off. Use caution in cold weather. Your numb leg or arm will not be able to feel extremes in temperature. Be sure to cover your limb appropriately before going outside in order to prevent frostbite.    Ask your family or other support people to help you with the above tips

## 2022-07-21 LAB
EKG ATRIAL RATE: 83 BPM
EKG P AXIS: 78 DEGREES
EKG P-R INTERVAL: 152 MS
EKG Q-T INTERVAL: 396 MS
EKG QRS DURATION: 94 MS
EKG QTC CALCULATION (BAZETT): 465 MS
EKG R AXIS: 68 DEGREES
EKG T AXIS: 72 DEGREES
EKG VENTRICULAR RATE: 83 BPM

## 2022-07-21 PROCEDURE — 93010 ELECTROCARDIOGRAM REPORT: CPT | Performed by: INTERNAL MEDICINE

## 2022-09-21 ENCOUNTER — ANESTHESIA (OUTPATIENT)
Dept: OPERATING ROOM | Age: 62
End: 2022-09-21
Payer: COMMERCIAL

## 2022-09-21 ENCOUNTER — HOSPITAL ENCOUNTER (OUTPATIENT)
Age: 62
Setting detail: OUTPATIENT SURGERY
Discharge: HOME | End: 2022-09-21
Attending: ORTHOPAEDIC SURGERY | Admitting: ORTHOPAEDIC SURGERY
Payer: COMMERCIAL

## 2022-09-21 ENCOUNTER — APPOINTMENT (OUTPATIENT)
Dept: GENERAL RADIOLOGY | Age: 62
End: 2022-09-21
Attending: ORTHOPAEDIC SURGERY
Payer: COMMERCIAL

## 2022-09-21 ENCOUNTER — ANESTHESIA EVENT (OUTPATIENT)
Dept: OPERATING ROOM | Age: 62
End: 2022-09-21
Payer: COMMERCIAL

## 2022-09-21 VITALS
BODY MASS INDEX: 25.11 KG/M2 | HEART RATE: 70 BPM | DIASTOLIC BLOOD PRESSURE: 71 MMHG | WEIGHT: 133 LBS | RESPIRATION RATE: 20 BRPM | TEMPERATURE: 97 F | SYSTOLIC BLOOD PRESSURE: 113 MMHG | OXYGEN SATURATION: 99 % | HEIGHT: 61 IN

## 2022-09-21 DIAGNOSIS — R22.31 FOREARM MASS, RIGHT: ICD-10-CM

## 2022-09-21 DIAGNOSIS — Z47.2 ENCOUNTER FOR REMOVAL OF INTERNAL FIXATION DEVICE: ICD-10-CM

## 2022-09-21 LAB
HCT VFR BLD CALC: 39.8 % (ref 37–47)
HEMOGLOBIN: 13.4 G/DL (ref 12–16)
MCH RBC QN AUTO: 29.3 PG (ref 27–31.3)
MCHC RBC AUTO-ENTMCNC: 33.6 % (ref 33–37)
MCV RBC AUTO: 87 FL (ref 82–100)
PDW BLD-RTO: 12.8 % (ref 11.5–14.5)
PLATELET # BLD: 232 K/UL (ref 130–400)
RBC # BLD: 4.57 M/UL (ref 4.2–5.4)
WBC # BLD: 6 K/UL (ref 4.8–10.8)

## 2022-09-21 PROCEDURE — 3700000001 HC ADD 15 MINUTES (ANESTHESIA): Performed by: ORTHOPAEDIC SURGERY

## 2022-09-21 PROCEDURE — A4217 STERILE WATER/SALINE, 500 ML: HCPCS | Performed by: ORTHOPAEDIC SURGERY

## 2022-09-21 PROCEDURE — 88305 TISSUE EXAM BY PATHOLOGIST: CPT

## 2022-09-21 PROCEDURE — 3600000013 HC SURGERY LEVEL 3 ADDTL 15MIN: Performed by: ORTHOPAEDIC SURGERY

## 2022-09-21 PROCEDURE — 6360000002 HC RX W HCPCS: Performed by: ORTHOPAEDIC SURGERY

## 2022-09-21 PROCEDURE — 2709999900 HC NON-CHARGEABLE SUPPLY: Performed by: ORTHOPAEDIC SURGERY

## 2022-09-21 PROCEDURE — 2580000003 HC RX 258: Performed by: ORTHOPAEDIC SURGERY

## 2022-09-21 PROCEDURE — 64415 NJX AA&/STRD BRCH PLXS IMG: CPT | Performed by: ANESTHESIOLOGY

## 2022-09-21 PROCEDURE — 6360000002 HC RX W HCPCS: Performed by: NURSE ANESTHETIST, CERTIFIED REGISTERED

## 2022-09-21 PROCEDURE — 93005 ELECTROCARDIOGRAM TRACING: CPT | Performed by: ANESTHESIOLOGY

## 2022-09-21 PROCEDURE — 88311 DECALCIFY TISSUE: CPT

## 2022-09-21 PROCEDURE — 3209999900 FLUORO FOR SURGICAL PROCEDURES

## 2022-09-21 PROCEDURE — 7100000010 HC PHASE II RECOVERY - FIRST 15 MIN: Performed by: ORTHOPAEDIC SURGERY

## 2022-09-21 PROCEDURE — 85027 COMPLETE CBC AUTOMATED: CPT

## 2022-09-21 PROCEDURE — 6360000002 HC RX W HCPCS: Performed by: ANESTHESIOLOGY

## 2022-09-21 PROCEDURE — 3700000000 HC ANESTHESIA ATTENDED CARE: Performed by: ORTHOPAEDIC SURGERY

## 2022-09-21 PROCEDURE — 7100000011 HC PHASE II RECOVERY - ADDTL 15 MIN: Performed by: ORTHOPAEDIC SURGERY

## 2022-09-21 PROCEDURE — 3600000003 HC SURGERY LEVEL 3 BASE: Performed by: ORTHOPAEDIC SURGERY

## 2022-09-21 PROCEDURE — 7100000000 HC PACU RECOVERY - FIRST 15 MIN: Performed by: ORTHOPAEDIC SURGERY

## 2022-09-21 PROCEDURE — 7100000001 HC PACU RECOVERY - ADDTL 15 MIN: Performed by: ORTHOPAEDIC SURGERY

## 2022-09-21 PROCEDURE — 2580000003 HC RX 258: Performed by: ANESTHESIOLOGY

## 2022-09-21 RX ORDER — DIPHENHYDRAMINE HYDROCHLORIDE 50 MG/ML
12.5 INJECTION INTRAMUSCULAR; INTRAVENOUS
Status: DISCONTINUED | OUTPATIENT
Start: 2022-09-21 | End: 2022-09-21 | Stop reason: HOSPADM

## 2022-09-21 RX ORDER — OXYCODONE HYDROCHLORIDE 5 MG/1
5 TABLET ORAL PRN
Status: DISCONTINUED | OUTPATIENT
Start: 2022-09-21 | End: 2022-09-21 | Stop reason: HOSPADM

## 2022-09-21 RX ORDER — LIDOCAINE HYDROCHLORIDE 20 MG/ML
INJECTION, SOLUTION INTRAVENOUS PRN
Status: DISCONTINUED | OUTPATIENT
Start: 2022-09-21 | End: 2022-09-21 | Stop reason: SDUPTHER

## 2022-09-21 RX ORDER — OXYCODONE HYDROCHLORIDE 5 MG/1
10 TABLET ORAL PRN
Status: DISCONTINUED | OUTPATIENT
Start: 2022-09-21 | End: 2022-09-21 | Stop reason: HOSPADM

## 2022-09-21 RX ORDER — MEPERIDINE HYDROCHLORIDE 25 MG/ML
12.5 INJECTION INTRAMUSCULAR; INTRAVENOUS; SUBCUTANEOUS
Status: DISCONTINUED | OUTPATIENT
Start: 2022-09-21 | End: 2022-09-21 | Stop reason: HOSPADM

## 2022-09-21 RX ORDER — FENTANYL CITRATE 50 UG/ML
50 INJECTION, SOLUTION INTRAMUSCULAR; INTRAVENOUS EVERY 10 MIN PRN
Status: DISCONTINUED | OUTPATIENT
Start: 2022-09-21 | End: 2022-09-21 | Stop reason: HOSPADM

## 2022-09-21 RX ORDER — SODIUM CHLORIDE 0.9 % (FLUSH) 0.9 %
5-40 SYRINGE (ML) INJECTION PRN
Status: DISCONTINUED | OUTPATIENT
Start: 2022-09-21 | End: 2022-09-21 | Stop reason: HOSPADM

## 2022-09-21 RX ORDER — ONDANSETRON 2 MG/ML
INJECTION INTRAMUSCULAR; INTRAVENOUS PRN
Status: DISCONTINUED | OUTPATIENT
Start: 2022-09-21 | End: 2022-09-21 | Stop reason: SDUPTHER

## 2022-09-21 RX ORDER — SODIUM CHLORIDE, SODIUM LACTATE, POTASSIUM CHLORIDE, CALCIUM CHLORIDE 600; 310; 30; 20 MG/100ML; MG/100ML; MG/100ML; MG/100ML
INJECTION, SOLUTION INTRAVENOUS CONTINUOUS
Status: DISCONTINUED | OUTPATIENT
Start: 2022-09-21 | End: 2022-09-21 | Stop reason: HOSPADM

## 2022-09-21 RX ORDER — SODIUM CHLORIDE 0.9 % (FLUSH) 0.9 %
5-40 SYRINGE (ML) INJECTION EVERY 12 HOURS SCHEDULED
Status: DISCONTINUED | OUTPATIENT
Start: 2022-09-21 | End: 2022-09-21 | Stop reason: HOSPADM

## 2022-09-21 RX ORDER — MAGNESIUM HYDROXIDE 1200 MG/15ML
LIQUID ORAL CONTINUOUS PRN
Status: COMPLETED | OUTPATIENT
Start: 2022-09-21 | End: 2022-09-21

## 2022-09-21 RX ORDER — MIDAZOLAM HYDROCHLORIDE 1 MG/ML
INJECTION INTRAMUSCULAR; INTRAVENOUS PRN
Status: DISCONTINUED | OUTPATIENT
Start: 2022-09-21 | End: 2022-09-21 | Stop reason: SDUPTHER

## 2022-09-21 RX ORDER — PROPOFOL 10 MG/ML
INJECTION, EMULSION INTRAVENOUS PRN
Status: DISCONTINUED | OUTPATIENT
Start: 2022-09-21 | End: 2022-09-21 | Stop reason: SDUPTHER

## 2022-09-21 RX ORDER — FENTANYL CITRATE 50 UG/ML
INJECTION, SOLUTION INTRAMUSCULAR; INTRAVENOUS PRN
Status: DISCONTINUED | OUTPATIENT
Start: 2022-09-21 | End: 2022-09-21 | Stop reason: SDUPTHER

## 2022-09-21 RX ORDER — ROPIVACAINE HYDROCHLORIDE 5 MG/ML
INJECTION, SOLUTION EPIDURAL; INFILTRATION; PERINEURAL PRN
Status: DISCONTINUED | OUTPATIENT
Start: 2022-09-21 | End: 2022-09-21 | Stop reason: SDUPTHER

## 2022-09-21 RX ORDER — SODIUM CHLORIDE 9 MG/ML
25 INJECTION, SOLUTION INTRAVENOUS PRN
Status: DISCONTINUED | OUTPATIENT
Start: 2022-09-21 | End: 2022-09-21 | Stop reason: HOSPADM

## 2022-09-21 RX ORDER — METOCLOPRAMIDE HYDROCHLORIDE 5 MG/ML
10 INJECTION INTRAMUSCULAR; INTRAVENOUS
Status: DISCONTINUED | OUTPATIENT
Start: 2022-09-21 | End: 2022-09-21 | Stop reason: HOSPADM

## 2022-09-21 RX ORDER — KETOROLAC TROMETHAMINE 30 MG/ML
INJECTION, SOLUTION INTRAMUSCULAR; INTRAVENOUS PRN
Status: DISCONTINUED | OUTPATIENT
Start: 2022-09-21 | End: 2022-09-21 | Stop reason: SDUPTHER

## 2022-09-21 RX ORDER — DEXAMETHASONE SODIUM PHOSPHATE 4 MG/ML
INJECTION, SOLUTION INTRA-ARTICULAR; INTRALESIONAL; INTRAMUSCULAR; INTRAVENOUS; SOFT TISSUE PRN
Status: DISCONTINUED | OUTPATIENT
Start: 2022-09-21 | End: 2022-09-21 | Stop reason: SDUPTHER

## 2022-09-21 RX ORDER — ONDANSETRON 2 MG/ML
4 INJECTION INTRAMUSCULAR; INTRAVENOUS
Status: DISCONTINUED | OUTPATIENT
Start: 2022-09-21 | End: 2022-09-21 | Stop reason: HOSPADM

## 2022-09-21 RX ADMIN — FENTANYL CITRATE 50 MCG: 50 INJECTION, SOLUTION INTRAMUSCULAR; INTRAVENOUS at 13:12

## 2022-09-21 RX ADMIN — ROPIVACAINE HYDROCHLORIDE 30 ML: 5 INJECTION, SOLUTION EPIDURAL; INFILTRATION; PERINEURAL at 12:50

## 2022-09-21 RX ADMIN — CEFAZOLIN 2 G: 10 INJECTION, POWDER, FOR SOLUTION INTRAVENOUS at 13:09

## 2022-09-21 RX ADMIN — FENTANYL CITRATE 25 MCG: 50 INJECTION, SOLUTION INTRAMUSCULAR; INTRAVENOUS at 13:09

## 2022-09-21 RX ADMIN — PROPOFOL 150 MG: 10 INJECTION, EMULSION INTRAVENOUS at 13:12

## 2022-09-21 RX ADMIN — KETOROLAC TROMETHAMINE 30 MG: 30 INJECTION, SOLUTION INTRAMUSCULAR; INTRAVENOUS at 13:20

## 2022-09-21 RX ADMIN — FENTANYL CITRATE 25 MCG: 50 INJECTION, SOLUTION INTRAMUSCULAR; INTRAVENOUS at 13:47

## 2022-09-21 RX ADMIN — SODIUM CHLORIDE, POTASSIUM CHLORIDE, SODIUM LACTATE AND CALCIUM CHLORIDE: 600; 310; 30; 20 INJECTION, SOLUTION INTRAVENOUS at 12:39

## 2022-09-21 RX ADMIN — LIDOCAINE HYDROCHLORIDE 50 MG: 20 INJECTION, SOLUTION INTRAVENOUS at 13:12

## 2022-09-21 RX ADMIN — DEXAMETHASONE SODIUM PHOSPHATE 4 MG: 4 INJECTION, SOLUTION INTRA-ARTICULAR; INTRALESIONAL; INTRAMUSCULAR; INTRAVENOUS; SOFT TISSUE at 13:20

## 2022-09-21 RX ADMIN — MIDAZOLAM HYDROCHLORIDE 2 MG: 1 INJECTION, SOLUTION INTRAMUSCULAR; INTRAVENOUS at 12:48

## 2022-09-21 RX ADMIN — ONDANSETRON 4 MG: 2 INJECTION INTRAMUSCULAR; INTRAVENOUS at 13:20

## 2022-09-21 ASSESSMENT — PAIN - FUNCTIONAL ASSESSMENT: PAIN_FUNCTIONAL_ASSESSMENT: NONE - DENIES PAIN

## 2022-09-21 ASSESSMENT — PAIN SCALES - GENERAL: PAINLEVEL_OUTOF10: 0

## 2022-09-21 NOTE — ANESTHESIA PROCEDURE NOTES
Peripheral Block    Patient location during procedure: pre-op  Reason for block: post-op pain management  Start time: 9/21/2022 12:50 PM  Staffing  Performed: anesthesiologist   Anesthesiologist: Anh Conner MD  Preanesthetic Checklist  Completed: patient identified, IV checked, site marked, risks and benefits discussed, surgical/procedural consents, equipment checked, pre-op evaluation, timeout performed, anesthesia consent given, oxygen available, monitors applied/VS acknowledged, fire risk safety assessment completed and verbalized and blood product R/B/A discussed and consented  Peripheral Block   Patient position: supine  Prep: ChloraPrep  Provider prep: mask and sterile gloves  Patient monitoring: continuous pulse ox, frequent blood pressure checks, IV access and oxygen  Block type: Brachial plexus  Supraclavicular  Laterality: left  Injection technique: single-shot  Guidance: ultrasound guided  Local infiltration: ropivacaine  Infiltration strength: 0.5 %  Local infiltration: ropivacaine  Dose: 30 mL    Needle   Needle type: insulated echogenic nerve stimulator needle   Needle gauge: 21 G  Needle localization: ultrasound guidance  Test dose: negative  Needle length: 10 cm  Assessment   Injection assessment: negative aspiration for heme, no paresthesia on injection, local visualized surrounding nerve on ultrasound and no intravascular symptoms  Paresthesia pain: none  Slow fractionated injection: yes  Hemodynamics: stable  Outcomes: uncomplicated

## 2022-09-21 NOTE — OP NOTE
Operative Note      Patient: Merari Gonzalez  YOB: 1960  MRN: [id number]    Date of Procedure: 9/21/2022      Preoperative diagnosis: Indwelling deep hardware left wrist.  Bone lesion left distal ulna. Postoperative diagnosis: Same     Procedure planned: Removal of deep indwelling hardware left wrist.  Open bone biopsy left distal ulna. Procedure performed: Same    Surgeon: Bruce Dowell D.O. Assistant: PEYTON Alvarez  The physician assistant was present through the entire case. Given the nature of the disease process and the procedure to be performed a skilled surgical assistant was necessary during the case. The assistant was necessary in order to hold retractors and directly assist in the operation. A certified scrub tech was at the back table managing instruments and supplies for the surgical case. Anesthesia: General    Estimated blood loss: less than 10 cc    Drains: None    Tourniquet: Approximately 10 minutes at 250 mmHg to the well-padded left upper arm    Specimens: Bone for pathology, distal ulna    Implants: None    Indications: The patient sustained complex injury to the left wrist necessitating operative stabilization of distal radius with open treatment of scapholunate ligament disruption. Pins were placed to secure reduction between scaphoid and lunate during the healing phase. Recommendations were made for removal of the indwelling pins. The patient had preoperative CT around the time of the original injury that demonstrated possible lesion to the distal ulna. Intraoperatively that was sampled the last time we were in the operating room but the pathology read was not definitive. Treatment options were discussed. The patient elected to proceed forth with removal of the indwelling pins with concomitant open bone biopsy of distal ulna. Informed consent was signed and placed in the chart.     Complications: None noted at the time of surgery     Description of

## 2022-09-21 NOTE — ANESTHESIA POSTPROCEDURE EVALUATION
Department of Anesthesiology  Postprocedure Note    Patient: Toya Grimm  MRN: [id number]  YOB: 1960  Date of evaluation: 9/21/2022      Procedure Summary     Date: 09/21/22 Room / Location: [address]    Anesthesia Start: 6184 Anesthesia Stop:     Procedure: LEFT HARDWARE REMOVAL LEFT WRIST, LEFT ULNA OPEN BONE BIOPSY (Left: Wrist) Diagnosis:       Encounter for removal of internal fixation device      Forearm mass, right      (LEFT WRIST RETAINED HARDWARE, LEFT ULNAR MASS)    Surgeons: Elena Banks DO Responsible Provider: Oksana Dancer, MD    Anesthesia Type: general ASA Status: 2          Anesthesia Type: No value filed.     Mei Phase I: Mei Score: 10    Mei Phase II:        Anesthesia Post Evaluation    Patient location during evaluation: PACU  Patient participation: complete - patient participated  Level of consciousness: awake and alert  Pain score: 0  Airway patency: patent  Nausea & Vomiting: no nausea  Complications: no  Cardiovascular status: hemodynamically stable  Respiratory status: acceptable  Hydration status: stable  Multimodal analgesia pain management approach

## 2022-09-21 NOTE — ANESTHESIA PRE PROCEDURE
Department of Anesthesiology  Preprocedure Note       Name:  Cony Tan   Age:  65 y.o.  :  1960                                          MRN:  [id number]         Date:  2022      Surgeon: Linnea Dickerson):  Carlos A Russo DO    Procedure: Procedure(s):  LEFT HARDWARE REMOVAL LEFT WRIST,POSSIBLE LEFT ULNA OPEN BONE BIOPSY  SUPINE  C-ARM    Medications prior to admission:   Prior to Admission medications    Medication Sig Start Date End Date Taking? Authorizing Provider   venlafaxine (EFFEXOR) 100 MG tablet Take 100 mg by mouth 2 times daily at 0800 and 1400    Historical Provider, MD   oxyCODONE-acetaminophen (PERCOCET)  MG per tablet Take 1 tablet by mouth every 4 hours as needed for Pain. Historical Provider, MD   simvastatin (ZOCOR) 40 MG tablet Take 40 mg by mouth nightly    Historical Provider, MD       Current medications:    No current facility-administered medications for this encounter. Allergies: Allergies   Allergen Reactions    Sulfa Antibiotics Itching and Rash       Problem List:  There is no problem list on file for this patient. Past Medical History:  No past medical history on file. Past Surgical History:        Procedure Laterality Date    FOREARM SURGERY Left 2022    OPEN REDUCTION INTERNAL FIXATION LEFT DISTAL RADIUS FRACTURE WITH OPEN SCAPULUNATE LIGAMENT REPAIR, OPEN BIOPSY OF LEFT ULNA performed by Carlos A Russo DO at [facility] History:    Social History     Tobacco Use    Smoking status: Not on file    Smokeless tobacco: Not on file   Substance Use Topics    Alcohol use: Not on file                                Counseling given: Not Answered      Vital Signs (Current): There were no vitals filed for this visit.                                            BP Readings from Last 3 Encounters:   22 123/66       NPO Status:                                                                                 BMI:   Wt Readings from Last 3 Encounters:   07/20/22 130 lb (59 kg)     There is no height or weight on file to calculate BMI.    CBC:   Lab Results   Component Value Date/Time    WBC 6.2 07/20/2022 11:17 AM    RBC 4.71 07/20/2022 11:17 AM    HGB 14.1 07/20/2022 11:17 AM    HCT 41.7 07/20/2022 11:17 AM    MCV 88.6 07/20/2022 11:17 AM    RDW 13.9 07/20/2022 11:17 AM     07/20/2022 11:17 AM       CMP:   Lab Results   Component Value Date/Time     07/20/2022 11:17 AM    K 4.3 07/20/2022 11:17 AM     07/20/2022 11:17 AM    CO2 23 07/20/2022 11:17 AM    BUN 26 07/20/2022 11:17 AM    CREATININE 0.69 07/20/2022 11:17 AM    GFRAA >60.0 07/20/2022 11:17 AM    LABGLOM >60.0 07/20/2022 11:17 AM    GLUCOSE 83 07/20/2022 11:17 AM    PROT 7.4 07/20/2022 11:17 AM    CALCIUM 10.0 07/20/2022 11:17 AM    BILITOT 0.7 07/20/2022 11:17 AM    ALKPHOS 141 07/20/2022 11:17 AM    AST 18 07/20/2022 11:17 AM    ALT 13 07/20/2022 11:17 AM       POC Tests: No results for input(s): POCGLU, POCNA, POCK, POCCL, POCBUN, POCHEMO, POCHCT in the last 72 hours.     Coags: No results found for: PROTIME, INR, APTT    HCG (If Applicable): No results found for: PREGTESTUR, PREGSERUM, HCG, HCGQUANT     ABGs: No results found for: PHART, PO2ART, XRP6WFO, XIU7CXS, BEART, W0SUWYCL     Type & Screen (If Applicable):  No results found for: LABABO, LABRH    Drug/Infectious Status (If Applicable):  No results found for: HIV, HEPCAB    COVID-19 Screening (If Applicable): No results found for: COVID19        Anesthesia Evaluation  Patient summary reviewed and Nursing notes reviewed no history of anesthetic complications:   Airway: Mallampati: II  TM distance: >3 FB   Neck ROM: full  Mouth opening: > = 3 FB   Dental: normal exam         Pulmonary:Negative Pulmonary ROS and normal exam                               Cardiovascular:Negative CV ROS          ECG reviewed                        Neuro/Psych:   Negative Neuro/Psych ROS              GI/Hepatic/Renal: Neg GI/Hepatic/Renal ROS            Endo/Other: Negative Endo/Other ROS                    Abdominal:             Vascular: negative vascular ROS. Other Findings:           Anesthesia Plan      general     ASA 2       Induction: intravenous. MIPS: Prophylactic antiemetics administered. Plan discussed with CRNA.     Attending anesthesiologist reviewed and agrees with Preprocedure content                Yuki Mares MD   9/21/2022

## 2022-09-22 LAB
EKG ATRIAL RATE: 76 BPM
EKG P AXIS: 47 DEGREES
EKG P-R INTERVAL: 164 MS
EKG Q-T INTERVAL: 408 MS
EKG QRS DURATION: 88 MS
EKG QTC CALCULATION (BAZETT): 459 MS
EKG R AXIS: 37 DEGREES
EKG T AXIS: 39 DEGREES
EKG VENTRICULAR RATE: 76 BPM

## 2023-03-09 ENCOUNTER — OFFICE VISIT (OUTPATIENT)
Dept: PRIMARY CARE | Facility: CLINIC | Age: 63
End: 2023-03-09
Payer: COMMERCIAL

## 2023-03-09 VITALS
BODY MASS INDEX: 24.56 KG/M2 | OXYGEN SATURATION: 98 % | RESPIRATION RATE: 16 BRPM | WEIGHT: 130 LBS | SYSTOLIC BLOOD PRESSURE: 124 MMHG | HEART RATE: 84 BPM | TEMPERATURE: 97.7 F | DIASTOLIC BLOOD PRESSURE: 82 MMHG

## 2023-03-09 DIAGNOSIS — J02.9 SORE THROAT: Primary | ICD-10-CM

## 2023-03-09 LAB — POC RAPID STREP: NEGATIVE

## 2023-03-09 PROCEDURE — 1036F TOBACCO NON-USER: CPT | Performed by: FAMILY MEDICINE

## 2023-03-09 PROCEDURE — 87880 STREP A ASSAY W/OPTIC: CPT | Performed by: FAMILY MEDICINE

## 2023-03-09 PROCEDURE — 87081 CULTURE SCREEN ONLY: CPT

## 2023-03-09 PROCEDURE — 99214 OFFICE O/P EST MOD 30 MIN: CPT | Performed by: FAMILY MEDICINE

## 2023-03-09 RX ORDER — VENLAFAXINE HYDROCHLORIDE 225 MG/1
225 TABLET, EXTENDED RELEASE ORAL DAILY
COMMUNITY
End: 2023-07-18

## 2023-03-09 RX ORDER — SIMVASTATIN 40 MG/1
40 TABLET, FILM COATED ORAL DAILY
COMMUNITY
End: 2023-06-21 | Stop reason: ALTCHOICE

## 2023-03-09 ASSESSMENT — ENCOUNTER SYMPTOMS
DYSURIA: 0
VOMITING: 1
SHORTNESS OF BREATH: 0
MYALGIAS: 0
EYE ITCHING: 0
SORE THROAT: 1
HEMATURIA: 0
DIARRHEA: 1
NAUSEA: 1
FEVER: 0
COUGH: 1
SWOLLEN GLANDS: 1
WHEEZING: 0
RHINORRHEA: 1

## 2023-03-09 NOTE — PROGRESS NOTES
Subjective   Patient ID: Elvira Faith is a 62 y.o. female who presents for No chief complaint on file..    st    Sore Throat   This is a chronic problem. The current episode started 1 to 4 weeks ago. The problem has been unchanged. There has been no fever. Associated symptoms include congestion, coughing, diarrhea, swollen glands and vomiting. Pertinent negatives include no ear pain or shortness of breath.        Review of Systems   Constitutional:  Negative for fever.        Pt had viral illness 4 wk ago. Resolved but now with intermittent st.   Home covid testing neg   HENT:  Positive for congestion, postnasal drip, rhinorrhea and sore throat. Negative for ear pain and sneezing.    Eyes:  Negative for itching.   Respiratory:  Positive for cough. Negative for shortness of breath and wheezing.    Cardiovascular:  Negative for chest pain.   Gastrointestinal:  Positive for diarrhea, nausea and vomiting.   Genitourinary:  Negative for dysuria and hematuria.   Musculoskeletal:  Negative for myalgias.       Objective   /82   Pulse 84   Temp 36.5 °C (97.7 °F) (Temporal)   Resp 16   Wt 59 kg (130 lb)   SpO2 98%   BMI 24.56 kg/m²     Physical Exam  Constitutional:       Appearance: Normal appearance.   HENT:      Head: Normocephalic and atraumatic.      Right Ear: Tympanic membrane, ear canal and external ear normal.      Left Ear: Tympanic membrane, ear canal and external ear normal.      Nose: Nose normal.      Mouth/Throat:      Pharynx: Oropharynx is clear.   Cardiovascular:      Rate and Rhythm: Normal rate and regular rhythm.      Heart sounds: Normal heart sounds.   Pulmonary:      Effort: Pulmonary effort is normal.      Breath sounds: Normal breath sounds.   Musculoskeletal:      Cervical back: Neck supple.   Lymphadenopathy:      Cervical: No cervical adenopathy.   Skin:     General: Skin is warm and dry.   Neurological:      Mental Status: She is alert.         Assessment/Plan   Problem List Items  Addressed This Visit          Other    Sore throat - Primary    Relevant Orders    Group A Streptococcus, Culture    POCT rapid strep A manually resulted     Pt with intermittent sore thoat  Rst today neg, will send b/up c/s.  May use otc claritin and flonase  F/up if no improvement

## 2023-03-12 LAB — GROUP A STREP SCREEN, CULTURE: NORMAL

## 2023-06-17 PROBLEM — G25.0 ESSENTIAL TREMOR: Status: ACTIVE | Noted: 2023-06-17

## 2023-06-17 PROBLEM — D12.6 COLON ADENOMA: Status: ACTIVE | Noted: 2023-06-17

## 2023-06-17 PROBLEM — E78.00 ELEVATED SERUM CHOLESTEROL: Status: ACTIVE | Noted: 2023-06-17

## 2023-06-17 PROBLEM — F32.A ANXIETY AND DEPRESSION: Status: ACTIVE | Noted: 2023-06-17

## 2023-06-17 PROBLEM — E78.49 HYPERLIPIDEMIA, FAMILIAL, HIGH LDL: Status: ACTIVE | Noted: 2023-06-17

## 2023-06-17 PROBLEM — R52 BODY ACHES: Status: ACTIVE | Noted: 2023-06-17

## 2023-06-17 PROBLEM — F41.9 ANXIETY AND DEPRESSION: Status: ACTIVE | Noted: 2023-06-17

## 2023-06-17 PROBLEM — S52.509A DISTAL RADIAL FRACTURE: Status: ACTIVE | Noted: 2023-06-17

## 2023-06-17 PROBLEM — M25.532 LEFT WRIST PAIN: Status: ACTIVE | Noted: 2023-06-17

## 2023-06-17 PROBLEM — G25.2 COARSE TREMORS: Status: ACTIVE | Noted: 2023-06-17

## 2023-06-17 RX ORDER — PROPRANOLOL HYDROCHLORIDE 10 MG/1
10 TABLET ORAL 2 TIMES DAILY
COMMUNITY
Start: 2021-09-28 | End: 2023-06-21 | Stop reason: ALTCHOICE

## 2023-06-17 RX ORDER — CALCIUM CARBONATE 300MG(750)
400 TABLET,CHEWABLE ORAL DAILY
COMMUNITY
End: 2023-06-21 | Stop reason: ALTCHOICE

## 2023-06-19 ENCOUNTER — LAB (OUTPATIENT)
Dept: LAB | Facility: LAB | Age: 63
End: 2023-06-19
Payer: COMMERCIAL

## 2023-06-19 DIAGNOSIS — Z00.00 HEALTHCARE MAINTENANCE: ICD-10-CM

## 2023-06-19 LAB
ALANINE AMINOTRANSFERASE (SGPT) (U/L) IN SER/PLAS: 10 U/L (ref 7–45)
ALBUMIN (G/DL) IN SER/PLAS: 4.5 G/DL (ref 3.4–5)
ALKALINE PHOSPHATASE (U/L) IN SER/PLAS: 105 U/L (ref 33–136)
ANION GAP IN SER/PLAS: 15 MMOL/L (ref 10–20)
ASPARTATE AMINOTRANSFERASE (SGOT) (U/L) IN SER/PLAS: 16 U/L (ref 9–39)
BILIRUBIN TOTAL (MG/DL) IN SER/PLAS: 0.9 MG/DL (ref 0–1.2)
CALCIUM (MG/DL) IN SER/PLAS: 9.9 MG/DL (ref 8.6–10.3)
CARBON DIOXIDE, TOTAL (MMOL/L) IN SER/PLAS: 29 MMOL/L (ref 21–32)
CHLORIDE (MMOL/L) IN SER/PLAS: 99 MMOL/L (ref 98–107)
CHOLESTEROL (MG/DL) IN SER/PLAS: 294 MG/DL (ref 0–199)
CHOLESTEROL IN HDL (MG/DL) IN SER/PLAS: 53.1 MG/DL
CHOLESTEROL/HDL RATIO: 5.5
CREATININE (MG/DL) IN SER/PLAS: 0.8 MG/DL (ref 0.5–1.05)
ERYTHROCYTE DISTRIBUTION WIDTH (RATIO) BY AUTOMATED COUNT: 12.6 % (ref 11.5–14.5)
ERYTHROCYTE MEAN CORPUSCULAR HEMOGLOBIN CONCENTRATION (G/DL) BY AUTOMATED: 32.9 G/DL (ref 32–36)
ERYTHROCYTE MEAN CORPUSCULAR VOLUME (FL) BY AUTOMATED COUNT: 91 FL (ref 80–100)
ERYTHROCYTES (10*6/UL) IN BLOOD BY AUTOMATED COUNT: 4.51 X10E12/L (ref 4–5.2)
GFR FEMALE: 83 ML/MIN/1.73M2
GLUCOSE (MG/DL) IN SER/PLAS: 97 MG/DL (ref 74–99)
HEMATOCRIT (%) IN BLOOD BY AUTOMATED COUNT: 41 % (ref 36–46)
HEMOGLOBIN (G/DL) IN BLOOD: 13.5 G/DL (ref 12–16)
LDL: 202 MG/DL (ref 0–99)
LEUKOCYTES (10*3/UL) IN BLOOD BY AUTOMATED COUNT: 6.6 X10E9/L (ref 4.4–11.3)
PLATELETS (10*3/UL) IN BLOOD AUTOMATED COUNT: 262 X10E9/L (ref 150–450)
POTASSIUM (MMOL/L) IN SER/PLAS: 4.6 MMOL/L (ref 3.5–5.3)
PROTEIN TOTAL: 6.7 G/DL (ref 6.4–8.2)
SODIUM (MMOL/L) IN SER/PLAS: 138 MMOL/L (ref 136–145)
THYROTROPIN (MIU/L) IN SER/PLAS BY DETECTION LIMIT <= 0.05 MIU/L: 1.69 MIU/L (ref 0.44–3.98)
TRIGLYCERIDE (MG/DL) IN SER/PLAS: 193 MG/DL (ref 0–149)
UREA NITROGEN (MG/DL) IN SER/PLAS: 19 MG/DL (ref 6–23)
VLDL: 39 MG/DL (ref 0–40)

## 2023-06-19 PROCEDURE — 80053 COMPREHEN METABOLIC PANEL: CPT

## 2023-06-19 PROCEDURE — 84443 ASSAY THYROID STIM HORMONE: CPT

## 2023-06-19 PROCEDURE — 80061 LIPID PANEL: CPT

## 2023-06-19 PROCEDURE — 36415 COLL VENOUS BLD VENIPUNCTURE: CPT

## 2023-06-19 PROCEDURE — 85027 COMPLETE CBC AUTOMATED: CPT

## 2023-06-20 NOTE — PROGRESS NOTES
Subjective   Patient ID: Elvira Faith is a 63 y.o. female who presents for her annual physical       She will make an appt with Dr Godfrey this year 2023    She Fx her wrist playing pickle ball  She is back to playing and wears a wrist brace     Right hand second finger has a cyst. She just started wearing a glove when playing pickle ball  Her  told her she is holding her pickle ball racquet wrong   She has right tennis elbow also     She is not taking simvastatin.  She has changed her diet hoping that would be enough to control her lipids     She denies LE edema     HEALTH:  Pap 8/2012 with Dr Godfrey- she will call and make an appt   Mammo 5/19 - , 3/2021, 5/2022, and ordered 6/2023   MRI of breasts in 11/2022 normal   US of breasts 5/19 -  BD never and ordered 6/2023   Colon 1/13 tubular adenoma and 4/2021 fragments of serrated adenoma and Q 5   Ca cardiac score ordered 2/2021   EKG 2/2021, 6/2023  Urine-  Hep C 12/2021-  Flu 10/2021, 9/2022  TDAP recommend updating 6/2023  Prevnar never   Pneumo never   Shingrix 2/23/2022 and 8/23/2022  Moderna CVD 4/21/2021 and 5/19/2021 booster 1/20/2022, 6/10/2022, 9/20/2022  Ophth She wears OTC reading glasses. No glaucoma or MD. Her father has MD.     Review of Systems  All systems negative except those listed in the HPI      Past Medical, Surgical, and Family History reviewed and updated in chart.  Reviewed all medications by prescribing practitioner or clinical pharmacist   (such as prescriptions, OTCs, herbal therapies and supplements) and documented in the medical record       Objective   Visit Vitals  /80   Pulse 72    Body mass index is 24.78 kg/m².      Physical Exam  Vitals reviewed.   Constitutional:       Appearance: Normal appearance. She is normal weight.   HENT:      Head: Normocephalic and atraumatic.      Right Ear: Tympanic membrane, ear canal and external ear normal.      Left Ear: Tympanic membrane, ear canal and external ear normal.       Nose: Nose normal.      Mouth/Throat:      Pharynx: Oropharynx is clear.   Eyes:      Conjunctiva/sclera: Conjunctivae normal.   Cardiovascular:      Rate and Rhythm: Normal rate and regular rhythm.      Pulses: Normal pulses.      Heart sounds: Normal heart sounds.   Pulmonary:      Effort: Pulmonary effort is normal.      Breath sounds: Normal breath sounds.   Chest:      Comments: Breast exam normal except dense bilaterally   Abdominal:      General: Bowel sounds are normal.      Palpations: Abdomen is soft.   Musculoskeletal:         General: Normal range of motion.      Cervical back: Normal range of motion and neck supple.      Comments: right second finger has a hard nodule at base of the forefinger   Skin:     General: Skin is warm.   Neurological:      General: No focal deficit present.      Mental Status: She is alert and oriented to person, place, and time.   Psychiatric:         Mood and Affect: Mood normal.         Behavior: Behavior normal.         Thought Content: Thought content normal.         Judgment: Judgment normal.     Assessment/Plan   Problem List Items Addressed This Visit       Anxiety and depression    Distal radial fracture    Hyperlipidemia, familial, high LDL     Other Visit Diagnoses       Healthcare maintenance    -  Primary    Relevant Orders    CBC (Completed)    Comprehensive Metabolic Panel (Completed)    Lipid Panel (Completed)    TSH with reflex to Free T4 if abnormal (Completed)    Visit for screening mammogram        Relevant Orders    BI mammo bilateral screening tomosynthesis    Osteoporosis screening                Annual physical completed  Reviewed her labs from 6/2023     [facility] completed  -  Discussed healthy diet and regular exercise.    -  Physical exam overall unremarkable. Immunizations reviewed and updated accordingly. Healthy lifestyle choices discussed (tobacco avoidance, appropriate alcohol use, avoidance of illicit substances).   -  Patient is  "wearing seatbelt.   -  Screening lab work ordered as indicated.    -  Age appropriate screening tests reviewed with patient.       She helps take care of her parents and a friend who is blind     She feels her hearing is not as sharp as it used to be     Her weight/ BMI is in normal range in office, recommend she maintain   She is exercising daily since she retired. Discussed goal for BMI to be 25 or less     BP in normal range in office. We will continue to monitor.  EKG was normal sinus 6/2023, no LVH or strain pattern   Recommend she monitor her BP periodically and call with elevated readings     I have spent 15 min face to face with this patient discussing their cardiac risk and behavioral therapies of nutrition choices and exercise. We are trying to eliminate habits that are contributing to their cardiac risk.  We agreed on a plan of how they can reduce their current CV risk   The patient's  10 yr CV risk was estimated at  4.7 % 6/2023     HLD: Uncontrolled.  and HDL 53 on labs in 6/2023. She is not taking simvastatin. She has changed her diet hoping that would be enough to control her lipids   Explained this is familial hypercholesteremia  Explained goal for LDL to be less than 100 and ideally less than 70   Restart simvastatin 40 mg daily.    Ca cardiac score 3/2021 was 63 in left main artery     Familial tremors:   Her brother has a constant twitch and is getting Botox injections.   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  She states if she takes Effexor \"late\" she has side effects   Continue venlafaxine 225 mg daily.     LT wrist pain. Fell playing pickle ball. Xrays @ "  [facility] 07/17/22   Status post open bone biopsy with normal findings.   2. Status post open reduction/internal fixation to the left wrist with substantial stiffness.  3. Status post open treatment of scapholunate ligament disruption, status post hardware removal. On 9-21-22.  She saw Dr Dawson in 1/2023    Right hand second finger has a cyst: On exam: right second finger has a hard nodule at base of the forefinger 6/2023  She just started wearing a glove when playing pickle ball  Her  told her she is holding her pickle ball racquet wrong     She has a history of BCC to the skin   She has not seen dermatology in some time   Recommend she make an appt with dermatology 2/2021     Pap was normal in 8/2012 with Dr Godfrey.   She will make an appt with Dr Godfrey this year 2023     Mammo in 5/2022 was normal and ordered 6/2023   US of breast negative in 5/19.   MRI of breasts in 11/2022 normal   Breast exam normal except dense bilaterally 6/2023  BD never and ordered 6/2023. She will make sure this is covered by insurance prior to getting     Colon 4/2021 fragments of serrated adenoma and Q 5   Her father had polyps and her brother had colon cancer   Her brother has tested positive for Miranda Syndrome but she tested negative     Ophth: Recommend she make an appt for updated eye exam 6/2023  Last seen in 2018. She wears OTC reading glasses. No glaucoma or MD.   Her father has MD.   She will have her next eye exam faxed to my office in order to update her medical records.     Family Hx:  Breast cancer - mother  Father had diverticulosis, CHF, HTN and breast cancer on his side of the family   Her father fell and broke a hip in 2021  MGM breast cancer  Maternal aunt with breast cancer   Her father had polyps and her brother had colon cancer   Her brother has tested positive for Miranda Syndrome but she tested negative     Hep C 12/2021-  Flu 10/2021, 9/2022  TDAP recommend updating 6/2023  Prevnar never   Pneumo never    Shingrix 2/23/2022 and 8/23/2022  Moderna CVD 4/21/2021 and 5/19/2021 booster 1/20/2022, 6/10/2022, 9/20/2022    Some elements in the chart were copied from Dr. Alvarez's last office visit with patient.   Notes have been updated where appropriate, and reflect my current medical decision making from today.       Return in 1 year for CPE or sooner if needed.     Scribe Attestation  By signing my name below, I, Noni Harrison , Scribe   attest that this documentation has been prepared under the direction and in the presence of Tammy Alvarez MD.

## 2023-06-21 ENCOUNTER — OFFICE VISIT (OUTPATIENT)
Dept: PRIMARY CARE | Facility: CLINIC | Age: 63
End: 2023-06-21
Payer: COMMERCIAL

## 2023-06-21 VITALS
BODY MASS INDEX: 24.35 KG/M2 | HEART RATE: 72 BPM | DIASTOLIC BLOOD PRESSURE: 80 MMHG | WEIGHT: 129 LBS | HEIGHT: 61 IN | OXYGEN SATURATION: 98 % | SYSTOLIC BLOOD PRESSURE: 120 MMHG

## 2023-06-21 DIAGNOSIS — Z12.31 VISIT FOR SCREENING MAMMOGRAM: ICD-10-CM

## 2023-06-21 DIAGNOSIS — F41.9 ANXIETY AND DEPRESSION: ICD-10-CM

## 2023-06-21 DIAGNOSIS — S52.502S CLOSED FRACTURE OF DISTAL END OF LEFT RADIUS, UNSPECIFIED FRACTURE MORPHOLOGY, SEQUELA: ICD-10-CM

## 2023-06-21 DIAGNOSIS — Z00.00 HEALTHCARE MAINTENANCE: Primary | ICD-10-CM

## 2023-06-21 DIAGNOSIS — E78.49 HYPERLIPIDEMIA, FAMILIAL, HIGH LDL: ICD-10-CM

## 2023-06-21 DIAGNOSIS — F32.A ANXIETY AND DEPRESSION: ICD-10-CM

## 2023-06-21 DIAGNOSIS — Z13.820 OSTEOPOROSIS SCREENING: ICD-10-CM

## 2023-06-21 PROCEDURE — 99396 PREV VISIT EST AGE 40-64: CPT | Performed by: INTERNAL MEDICINE

## 2023-06-21 PROCEDURE — 93000 ELECTROCARDIOGRAM COMPLETE: CPT | Performed by: INTERNAL MEDICINE

## 2023-06-21 PROCEDURE — 1036F TOBACCO NON-USER: CPT | Performed by: INTERNAL MEDICINE

## 2023-07-17 DIAGNOSIS — F41.9 ANXIETY DISORDER, UNSPECIFIED: ICD-10-CM

## 2023-07-17 DIAGNOSIS — F32.A DEPRESSION, UNSPECIFIED: ICD-10-CM

## 2023-07-18 RX ORDER — VENLAFAXINE HYDROCHLORIDE 225 MG/1
TABLET, EXTENDED RELEASE ORAL
Qty: 90 TABLET | Refills: 3 | Status: SHIPPED | OUTPATIENT
Start: 2023-07-18 | End: 2023-12-13

## 2023-12-12 DIAGNOSIS — F32.A DEPRESSION, UNSPECIFIED: ICD-10-CM

## 2023-12-12 DIAGNOSIS — E78.9 DISORDER OF LIPOPROTEIN METABOLISM, UNSPECIFIED: ICD-10-CM

## 2023-12-12 DIAGNOSIS — F41.9 ANXIETY DISORDER, UNSPECIFIED: ICD-10-CM

## 2023-12-13 RX ORDER — SIMVASTATIN 40 MG/1
40 TABLET, FILM COATED ORAL DAILY
Qty: 90 TABLET | Refills: 3 | Status: SHIPPED | OUTPATIENT
Start: 2023-12-13

## 2023-12-13 RX ORDER — VENLAFAXINE HYDROCHLORIDE 225 MG/1
225 TABLET, EXTENDED RELEASE ORAL DAILY
Qty: 90 TABLET | Refills: 3 | Status: SHIPPED | OUTPATIENT
Start: 2023-12-13

## 2024-02-22 ENCOUNTER — TELEPHONE (OUTPATIENT)
Dept: PRIMARY CARE | Facility: CLINIC | Age: 64
End: 2024-02-22
Payer: COMMERCIAL

## 2024-02-22 DIAGNOSIS — Z00.00 HEALTHCARE MAINTENANCE: Primary | ICD-10-CM

## 2024-02-22 NOTE — TELEPHONE ENCOUNTER
Pt of Dr Alvarez. Has an upcoming appt in June for her annual and called in asking for her labs to be ordered. Please advise. Thank you!

## 2024-03-11 ENCOUNTER — HOSPITAL ENCOUNTER (OUTPATIENT)
Dept: RADIOLOGY | Facility: HOSPITAL | Age: 64
Discharge: HOME | End: 2024-03-11
Payer: COMMERCIAL

## 2024-03-11 VITALS — BODY MASS INDEX: 24.55 KG/M2 | WEIGHT: 130 LBS | HEIGHT: 61 IN

## 2024-03-11 DIAGNOSIS — Z12.31 VISIT FOR SCREENING MAMMOGRAM: ICD-10-CM

## 2024-03-11 DIAGNOSIS — Z13.820 OSTEOPOROSIS SCREENING: ICD-10-CM

## 2024-03-11 PROCEDURE — 77067 SCR MAMMO BI INCL CAD: CPT | Mod: BILATERAL PROCEDURE | Performed by: RADIOLOGY

## 2024-03-11 PROCEDURE — 77067 SCR MAMMO BI INCL CAD: CPT

## 2024-03-11 PROCEDURE — 77080 DXA BONE DENSITY AXIAL: CPT

## 2024-03-11 PROCEDURE — 77080 DXA BONE DENSITY AXIAL: CPT | Performed by: RADIOLOGY

## 2024-03-11 PROCEDURE — 77063 BREAST TOMOSYNTHESIS BI: CPT | Mod: BILATERAL PROCEDURE | Performed by: RADIOLOGY

## 2024-04-17 NOTE — PROGRESS NOTES
Elvira Faith is a 63 y.o. G 2 (2001)      Last Gyn Visit: Has been seeing Dr. Hussein  Last pap: per pt 7 yr or so  Mammogram: 3/11/2024 unremarkable  Colonoscopy: 4/8/2021 with sessile polyp    Postmenopausal without bleeding  Does complain increasingly uncomfortable intercourse due to dryness        General:   Alert and oriented, in no acute distress   Neck: Supple. No visible thyromegaly.    Breast/Axilla: Normal to palpation bilaterally without masses, skin changes, or nipple discharge.    Abdomen: Soft, non-tender, without masses or organomegaly   Vulva: Normal architecture without erythema, masses, or lesions.    Vagina: Normal mucosa without lesions, masses, or atrophy. No abnormal vaginal discharge.    Cervix: Normal without masses, lesions, or signs of cervicitis.    Uterus: Normal mobile, non-enlarged uterus    Adnexa: Normal without masses or lesions   Pelvic Floor No POP noted. No high tone pelvic floor    Psych Normal affect. Normal mood.            Assessment and Plan:  Pap with cotesting today, if this is unremarkable she can stop screening   Dyspareunia with vaginal atrophy; over-the-counter lubrication discussed; patient also given a prescription for Vagifem

## 2024-04-23 ENCOUNTER — OFFICE VISIT (OUTPATIENT)
Dept: OBSTETRICS AND GYNECOLOGY | Facility: CLINIC | Age: 64
End: 2024-04-23
Payer: COMMERCIAL

## 2024-04-23 VITALS
DIASTOLIC BLOOD PRESSURE: 80 MMHG | HEIGHT: 61 IN | BODY MASS INDEX: 24.55 KG/M2 | WEIGHT: 130 LBS | SYSTOLIC BLOOD PRESSURE: 124 MMHG

## 2024-04-23 DIAGNOSIS — Z12.4 CERVICAL CANCER SCREENING: ICD-10-CM

## 2024-04-23 DIAGNOSIS — N95.1 VAGINAL DRYNESS, MENOPAUSAL: ICD-10-CM

## 2024-04-23 DIAGNOSIS — Z01.419 WELL WOMAN EXAM WITH ROUTINE GYNECOLOGICAL EXAM: Primary | ICD-10-CM

## 2024-04-23 PROCEDURE — 87624 HPV HI-RISK TYP POOLED RSLT: CPT

## 2024-04-23 PROCEDURE — 1036F TOBACCO NON-USER: CPT | Performed by: OBSTETRICS & GYNECOLOGY

## 2024-04-23 PROCEDURE — 88175 CYTOPATH C/V AUTO FLUID REDO: CPT

## 2024-04-23 PROCEDURE — 99386 PREV VISIT NEW AGE 40-64: CPT | Performed by: OBSTETRICS & GYNECOLOGY

## 2024-04-23 ASSESSMENT — PAIN SCALES - GENERAL: PAINLEVEL: 0-NO PAIN

## 2024-05-02 LAB
CYTOLOGY CMNT CVX/VAG CYTO-IMP: NORMAL
HPV HR 12 DNA GENITAL QL NAA+PROBE: NEGATIVE
HPV HR GENOTYPES PNL CVX NAA+PROBE: NEGATIVE
HPV16 DNA SPEC QL NAA+PROBE: NEGATIVE
HPV18 DNA SPEC QL NAA+PROBE: NEGATIVE
LAB AP HPV GENOTYPE QUESTION: YES
LAB AP HPV HR: NORMAL
LABORATORY COMMENT REPORT: NORMAL
PATH REPORT.TOTAL CANCER: NORMAL

## 2024-05-14 RX ORDER — ESTRADIOL 10 UG/1
INSERT VAGINAL
Qty: 18 TABLET | Refills: 3 | Status: SHIPPED | OUTPATIENT
Start: 2024-05-14

## 2024-06-25 NOTE — PROGRESS NOTES
Subjective   Patient ID: Elvira Faith is a 64 y.o. female who presents for her annual physical       She is tolerating the statin medication.    She is avoiding getting the TDAP vaccine because the shot hurts     She feels her anxiety is controlled  She is compliant with venlafaxine. She would like to decrease the dose if it will not affect her negatively   She has more season defective disorder       HEALTH:  Pap 8/12, 4/24 with Dr Godfrey and no longer needs to repeat   Mammo 5/19, 3/21, 5/22, 3/24  MRI of breasts in 11/22 normal   US of breasts 5/19 -  BD 3/24 T-1.9  Colon 1/13 TA, 4/21 fragments of serrated adenoma and Q 5   Ca cardiac score 3/2021 was 63 in left main artery   EKG 2/21, 6/23  Urine-  Hep C 12/21-  Flu 10/21, 9/22, 11/23  TDAP recommend updating 6/23  Prevnar never   Pneumo never   Shingrix 2/23/2022 and 8/23/2022  Moderna CVD 4/21 and 5/21 booster 1/22, 6/22, 9/22, 11/23  Ophth She wears OTC reading glasses. No glaucoma or MD. Her father has MD.        Review of Systems  All systems negative except those listed in the HPI      Past Medical, Surgical, and Family History reviewed and updated in chart.  Reviewed all medications by prescribing practitioner or clinical pharmacist   (such as prescriptions, OTCs, herbal therapies and supplements) and documented in the medical record       Objective   Visit Vitals  /70 (BP Location: Left arm, Patient Position: Sitting, BP Cuff Size: Adult)   Pulse 66   Temp 37 °C (98.6 °F)   Resp 16    Body mass index is 24.56 kg/m².     Physical Exam  Vitals reviewed.   Constitutional:       Appearance: Normal appearance. She is normal weight.   HENT:      Head: Normocephalic.      Right Ear: Tympanic membrane, ear canal and external ear normal.      Left Ear: Tympanic membrane, ear canal and external ear normal.      Nose: Nose normal.      Mouth/Throat:      Pharynx: Oropharynx is clear.   Eyes:      Conjunctiva/sclera: Conjunctivae normal.   Cardiovascular:       Rate and Rhythm: Normal rate and regular rhythm.      Pulses: Normal pulses.      Heart sounds: Normal heart sounds.   Pulmonary:      Effort: Pulmonary effort is normal.      Breath sounds: Normal breath sounds.   Chest:      Comments: Breast exam normal except dense breasts bilaterally  Abdominal:      General: Bowel sounds are normal.      Palpations: Abdomen is soft.   Musculoskeletal:         General: Normal range of motion.      Cervical back: Normal range of motion and neck supple.   Skin:     General: Skin is warm.   Neurological:      General: No focal deficit present.      Mental Status: She is alert and oriented to person, place, and time.   Psychiatric:         Mood and Affect: Mood normal.         Behavior: Behavior normal.         Thought Content: Thought content normal.         Judgment: Judgment normal.       Assessment/Plan   Problem List Items Addressed This Visit    None  Visit Diagnoses       Disorder of lipoprotein metabolism, unspecified        Anxiety disorder, unspecified        Depression, unspecified                Annual physical completed  Annual labs ordered- she had these drawn prior to exam but they are not resulted yet       Health Maintenence completed  -  Discussed healthy diet and regular exercise.    -  Physical exam overall unremarkable. Immunizations reviewed and updated accordingly. Healthy lifestyle choices discussed (tobacco avoidance, appropriate alcohol use, avoidance of illicit substances).   -  Patient is wearing seatbelt.   -  Screening lab work ordered as indicated.    -  Age appropriate screening tests reviewed with patient.       She is  with 2 sons. She has prev Hx of tobacco use, quit 2010   She worked for [employer]. She retired 5/2021    She helps take care of her parents and a friend who is blind     She will do the Life screening next year 2025 (vascular portion)      She feels her hearing is not as sharp as it used to be      Her weight/ BMI  is in normal range in office, recommend she maintain   Her weight is 130 pounds with BMI at 24.56 IO 6/2024   She is exercising daily since she retired.      BP in normal range in office. We will continue to monitor.  EKG was normal sinus 6/2023, no LVH or strain pattern   Recommend she monitor her BP periodically and call with elevated readings      I have spent 15 min face to face with this patient discussing their cardiac risk   We discussed the patients cardiovascular risk. If needed, lifestyle modifications recommended including: behavioral therapies of nutrition choices, exercise and eliminate habits that are contributing to their cardiac risk. We agreed to a plan to decrease his cardiovascular risks. Discussed ASA. Reviewed Guidelines and approved recommendations made to patient.   The patient's 10 yr CV risk was estimated at  5.5 % 6/2024. We can decrease her risks by 2% if we get tighter control of her lipid levels 6/2024      HLD: Labs pending  6/2024. Warned patient her cardiac risk is higher due to having an LDL of 190 or greater. Reviewed her Ca cardiac score in detail with patient 6/2024. If LDL is not below 160 we will increase Zocor and possibly add Zetia   Explained this is familial hypercholesteremia  Explained goal for LDL to be less than 100 and ideally less than 70   Continue simvastatin 40 mg daily.  Refilled 6/2024   Ca cardiac score 3/2021 was 63 in left main artery   Healthier food options discussed 6/2024.      Familial tremors:   Her brother has a constant twitch and is getting Botox injections.   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 She is compliant with venlafaxine. She would like to decrease  "the dose if it will not affect her negatively. She has flat affect with venlafaxine. She has more season defective disorder. Recommend getting a UV light to use during winter months. Discussed Wellbutrin, she will consider adding it this coming winter   She has taken depression medications for 21 years.  She states if she takes Effexor \"late\" she has side effects   Decrease venlafaxine to 150 mg and 37.5 mg daily. She will take the 37.5 mg for a month then stop and remain on venlafaxine 150 mg daily. Refilled 6/2024      LT wrist pain. Fell playing pickle ball. Xrays @  [facility] 07/17/22   - S/p open bone biopsy with normal findings.   - S/p open reduction/internal fixation to the left wrist with substantial stiffness.  - S/p open treatment of scapholunate ligament disruption, status post hardware removal. On 9-21-22.  She saw Dr Dawson in 1/2023     Right hand second finger has a cyst:   She just started wearing a glove when playing pickle ball  Her  told her she is holding her pickle ball racquet wrong      Hx of BCC to the skin   She has not seen dermatology in some time   Recommend she make an appt with dermatology 2/2021      She saw Dr Godfrey in 4/24 and Pap was normal and no longer needs to repeat   Continue Vagifem twice weekly      Mammo normal 3/24   US of breast negative in 5/19.   MRI of breasts in 11/2022 normal   Breast exam normal except dense breasts bilaterally 6/2024    BD 3/24 T-1.9. Recommend she begin OTC calcium 600 mg BID, OTC Vitamin D 2000 UT daily and eat 2 servings of calcium enriched foods daily.  Recommend she add Mg glycinate 200 mg nightly   Discussed importance of weight bearing exercises      Colon 4/2021 fragments of serrated adenoma and Q 5   Her father had polyps and her brother had colon cancer   Her brother has tested positive for Miranda Syndrome but she tested negative      Ophth:    Last seen in 2018. She wears OTC reading glasses. No glaucoma or MD.   Her father has MD. "   She will have her next eye exam faxed to my office in order to update her medical records.      Family Hx:  Breast cancer - mother  Father had diverticulosis, CHF, HTN and breast cancer on his side of the family   Her father fell and broke a hip in 2021  MGM breast cancer  Maternal aunt with breast cancer   Her father had polyps and her brother had colon cancer   Her brother has tested positive for Miranda Syndrome but she tested negative      Hep C 12/21-  Flu 10/21, 9/22, 11/23  TDAP recommend updating 6/2023  Prevnar never   Pneumo never   Shingrix 2/23/2022 and 8/23/2022  Moderna CVD 4/21 and 5/21 booster 1/22, 6/22, 9/22, 11/23      Some elements in the chart were copied from Dr. Alvarez's last office visit with patient.   Notes have been updated where appropriate, and reflect my current medical decision making from today.        Return in 1 year for CPE or sooner if needed.   (CPE due 6/2025)      Scribe Attestation  By signing my name below, I, Noni Harrison , Scribe   attest that this documentation has been prepared under the direction and in the presence of Tammy Alvarez MD.

## 2024-06-26 ENCOUNTER — LAB (OUTPATIENT)
Dept: LAB | Facility: LAB | Age: 64
End: 2024-06-26
Payer: COMMERCIAL

## 2024-06-26 ENCOUNTER — APPOINTMENT (OUTPATIENT)
Dept: PRIMARY CARE | Facility: CLINIC | Age: 64
End: 2024-06-26
Payer: COMMERCIAL

## 2024-06-26 VITALS
WEIGHT: 130 LBS | BODY MASS INDEX: 24.55 KG/M2 | HEIGHT: 61 IN | HEART RATE: 66 BPM | DIASTOLIC BLOOD PRESSURE: 70 MMHG | RESPIRATION RATE: 16 BRPM | TEMPERATURE: 98.6 F | SYSTOLIC BLOOD PRESSURE: 106 MMHG | OXYGEN SATURATION: 96 %

## 2024-06-26 DIAGNOSIS — F32.A ANXIETY AND DEPRESSION: ICD-10-CM

## 2024-06-26 DIAGNOSIS — Z00.00 HEALTHCARE MAINTENANCE: Primary | ICD-10-CM

## 2024-06-26 DIAGNOSIS — F41.9 ANXIETY DISORDER, UNSPECIFIED: ICD-10-CM

## 2024-06-26 DIAGNOSIS — E78.9 DISORDER OF LIPOPROTEIN METABOLISM, UNSPECIFIED: ICD-10-CM

## 2024-06-26 DIAGNOSIS — F41.9 ANXIETY AND DEPRESSION: ICD-10-CM

## 2024-06-26 DIAGNOSIS — F33.8 SEASONAL AFFECTIVE DISORDER (CMS-HCC): ICD-10-CM

## 2024-06-26 DIAGNOSIS — G25.0 ESSENTIAL TREMOR: ICD-10-CM

## 2024-06-26 DIAGNOSIS — E78.49 HYPERLIPIDEMIA, FAMILIAL, HIGH LDL: ICD-10-CM

## 2024-06-26 DIAGNOSIS — Z00.00 HEALTHCARE MAINTENANCE: ICD-10-CM

## 2024-06-26 DIAGNOSIS — F32.A DEPRESSION, UNSPECIFIED: ICD-10-CM

## 2024-06-26 PROBLEM — M25.532 LEFT WRIST PAIN: Status: RESOLVED | Noted: 2023-06-17 | Resolved: 2024-06-26

## 2024-06-26 PROBLEM — E78.00 ELEVATED SERUM CHOLESTEROL: Status: RESOLVED | Noted: 2023-06-17 | Resolved: 2024-06-26

## 2024-06-26 PROBLEM — J02.9 SORE THROAT: Status: RESOLVED | Noted: 2023-03-09 | Resolved: 2024-06-26

## 2024-06-26 LAB
ALBUMIN SERPL BCP-MCNC: 4.6 G/DL (ref 3.4–5)
ALP SERPL-CCNC: 91 U/L (ref 33–136)
ALT SERPL W P-5'-P-CCNC: 11 U/L (ref 7–45)
ANION GAP SERPL CALC-SCNC: 14 MMOL/L (ref 10–20)
AST SERPL W P-5'-P-CCNC: 14 U/L (ref 9–39)
BILIRUB SERPL-MCNC: 0.4 MG/DL (ref 0–1.2)
BUN SERPL-MCNC: 32 MG/DL (ref 6–23)
CALCIUM SERPL-MCNC: 9.6 MG/DL (ref 8.6–10.6)
CHLORIDE SERPL-SCNC: 102 MMOL/L (ref 98–107)
CHOLEST SERPL-MCNC: 175 MG/DL (ref 0–199)
CHOLESTEROL/HDL RATIO: 3.5
CO2 SERPL-SCNC: 29 MMOL/L (ref 21–32)
CREAT SERPL-MCNC: 0.74 MG/DL (ref 0.5–1.05)
EGFRCR SERPLBLD CKD-EPI 2021: >90 ML/MIN/1.73M*2
ERYTHROCYTE [DISTWIDTH] IN BLOOD BY AUTOMATED COUNT: 12.7 % (ref 11.5–14.5)
GLUCOSE SERPL-MCNC: 79 MG/DL (ref 74–99)
HCT VFR BLD AUTO: 39.4 % (ref 36–46)
HDLC SERPL-MCNC: 50 MG/DL
HGB BLD-MCNC: 12.6 G/DL (ref 12–16)
LDLC SERPL CALC-MCNC: 114 MG/DL
MCH RBC QN AUTO: 29.6 PG (ref 26–34)
MCHC RBC AUTO-ENTMCNC: 32 G/DL (ref 32–36)
MCV RBC AUTO: 93 FL (ref 80–100)
NON HDL CHOLESTEROL: 125 MG/DL (ref 0–149)
NRBC BLD-RTO: 0 /100 WBCS (ref 0–0)
PLATELET # BLD AUTO: 299 X10*3/UL (ref 150–450)
POTASSIUM SERPL-SCNC: 4.8 MMOL/L (ref 3.5–5.3)
PROT SERPL-MCNC: 6.5 G/DL (ref 6.4–8.2)
RBC # BLD AUTO: 4.25 X10*6/UL (ref 4–5.2)
SODIUM SERPL-SCNC: 140 MMOL/L (ref 136–145)
TRIGL SERPL-MCNC: 55 MG/DL (ref 0–149)
TSH SERPL-ACNC: 1.27 MIU/L (ref 0.44–3.98)
VLDL: 11 MG/DL (ref 0–40)
WBC # BLD AUTO: 7.9 X10*3/UL (ref 4.4–11.3)

## 2024-06-26 PROCEDURE — 80061 LIPID PANEL: CPT

## 2024-06-26 PROCEDURE — 80053 COMPREHEN METABOLIC PANEL: CPT

## 2024-06-26 PROCEDURE — 1036F TOBACCO NON-USER: CPT | Performed by: INTERNAL MEDICINE

## 2024-06-26 PROCEDURE — 36415 COLL VENOUS BLD VENIPUNCTURE: CPT

## 2024-06-26 PROCEDURE — 99396 PREV VISIT EST AGE 40-64: CPT | Performed by: INTERNAL MEDICINE

## 2024-06-26 PROCEDURE — 85027 COMPLETE CBC AUTOMATED: CPT

## 2024-06-26 PROCEDURE — 84443 ASSAY THYROID STIM HORMONE: CPT

## 2024-06-26 RX ORDER — VENLAFAXINE HYDROCHLORIDE 225 MG/1
225 TABLET, EXTENDED RELEASE ORAL DAILY
Qty: 90 TABLET | Refills: 3 | Status: SHIPPED | OUTPATIENT
Start: 2024-06-26 | End: 2024-06-26 | Stop reason: SDUPTHER

## 2024-06-26 RX ORDER — VENLAFAXINE HYDROCHLORIDE 150 MG/1
150 TABLET, EXTENDED RELEASE ORAL DAILY
Qty: 90 TABLET | Refills: 3 | Status: SHIPPED | OUTPATIENT
Start: 2024-06-26 | End: 2025-06-26

## 2024-06-26 RX ORDER — SIMVASTATIN 40 MG/1
40 TABLET, FILM COATED ORAL DAILY
Qty: 90 TABLET | Refills: 3 | Status: SHIPPED | OUTPATIENT
Start: 2024-06-26 | End: 2024-06-27 | Stop reason: DRUGHIGH

## 2024-06-26 RX ORDER — VENLAFAXINE HYDROCHLORIDE 37.5 MG/1
37.5 CAPSULE, EXTENDED RELEASE ORAL DAILY
Qty: 30 CAPSULE | Refills: 1 | Status: SHIPPED | OUTPATIENT
Start: 2024-06-26 | End: 2024-06-27

## 2024-06-26 ASSESSMENT — PATIENT HEALTH QUESTIONNAIRE - PHQ9
2. FEELING DOWN, DEPRESSED OR HOPELESS: NOT AT ALL
SUM OF ALL RESPONSES TO PHQ9 QUESTIONS 1 AND 2: 0
1. LITTLE INTEREST OR PLEASURE IN DOING THINGS: NOT AT ALL

## 2024-06-26 NOTE — PATIENT INSTRUCTIONS
It was a pleasure to see you today.  I would like to remind you about importance of a healthy lifestyle in order to improve your well-being and live longer. Try to engage in physical activities for at least 150 minutes per week.  Eat about 10 servings of fruits and vegetables daily. My advice is 2 servings of fruits and 8 servings of vegetables. For vegetables choose at least half of them green and at least half of them fresh.  Please avoid sugar, salt, fried food and saturated fat.  Please follow low carbohydrate diet and daily exercise routine for at least 30 minutes. Nutritional consultation is available, please let me know if you are interested. I will be happy to discuss details with you if interested.   Have a good day and stay well.      The ability to age comfortably depends on how you invest in your body.   Include physical activity in your daily routine. Physical activity increases blood flow to your whole body, including your brain. ...  Eat a healthy diet. A heart-healthy diet may benefit your brain.   Stay mentally active. Be social.   Treat cardiovascular disease.  No smoking, excessive EtOH intake or illicit drug use.

## 2024-06-27 DIAGNOSIS — F32.A ANXIETY AND DEPRESSION: Primary | ICD-10-CM

## 2024-06-27 DIAGNOSIS — F41.9 ANXIETY AND DEPRESSION: Primary | ICD-10-CM

## 2024-06-27 DIAGNOSIS — E78.9 DISORDER OF LIPOPROTEIN METABOLISM, UNSPECIFIED: ICD-10-CM

## 2024-06-27 RX ORDER — VENLAFAXINE 37.5 MG/1
TABLET ORAL
Qty: 30 TABLET | Refills: 0 | Status: SHIPPED | OUTPATIENT
Start: 2024-06-27

## 2024-06-27 RX ORDER — SIMVASTATIN 40 MG/1
80 TABLET, FILM COATED ORAL DAILY
Qty: 180 TABLET | Refills: 3 | Status: SHIPPED | OUTPATIENT
Start: 2024-06-27 | End: 2025-06-27

## 2024-06-27 NOTE — RESULT ENCOUNTER NOTE
Jacob singleton-  The cholesterol is greatly improved and really pleased with results and feel we can increase to 80 mg now and hope to get the LDL closer to 70   Thyroid is good   Liver is tolerating the medication   Rest of the labs are good range

## 2025-06-25 NOTE — PROGRESS NOTES
Subjective   Patient ID: Elvira Faith is a 65 y.o. female who presents for her annual physical     She is playing pickle ball. She really likes the game and has made a lot of friends     She is not taking the Vitamin D supplement because she is in the sun in summer months  She is taking Mg and calcium daily     She is compliant with her systemic medication   She thinks she is less fatigued since beginning Effexor     She complains of hyperhidrosis       HEALTH:  Pap 8/12, 4/24 with Dr Godfrey and no longer needs to repeat   Mammo 5/19, 3/21, 5/22, 3/24, ordered 6/25  MRI of breasts in 11/22 normal   US of breasts 5/19 -  -- Breast cancer - mother, maternal aunt and MGM   BD 3/24 T-1.9  Colon 1/13 TA, 4/21 fragments of serrated adenoma and Q 5   -father had polyps and brother had colon cancer. Her brother tested + for Miranda Syndrome-pt tested negative   Ca cardiac score 3/21 was 63 in left main artery   EKG 2/21, 6/23, repeat 2026  Urine-  Hep C 12/21-  Flu 10/21, 9/22, 11/23, 9/24  TDAP recommend updating 6/23, 6/24, 6/25  Prevnar never   Pneumo never   Shingrix 2/23/22 and 8/23/22  SARS-CoV-2- 4/21, 5/21, 1/22, 6/22, 9/22, 11/23, 9/24  Ophth She wears OTC reading glasses. No glaucoma or MD. Her father has MD.        Review of Systems  All systems negative except those listed in the HPI      Past Medical, Surgical, and Family History reviewed and updated in chart.  Reviewed all medications by prescribing practitioner or clinical pharmacist   (such as prescriptions, OTCs, herbal therapies and supplements) and documented in the medical record      Objective   Visit Vitals  /60 (BP Location: Left arm, Patient Position: Sitting, BP Cuff Size: Adult)   Pulse 64   Temp 35.9 °C (96.6 °F) (Temporal)    Body mass index is 23.05 kg/m².     Physical Exam  Vitals reviewed.   Constitutional:       Appearance: Normal appearance. She is normal weight.   HENT:      Head: Normocephalic.      Right Ear: Tympanic membrane, ear  canal and external ear normal.      Left Ear: Tympanic membrane, ear canal and external ear normal.      Nose: Nose normal.      Mouth/Throat:      Pharynx: Oropharynx is clear.   Eyes:      Conjunctiva/sclera: Conjunctivae normal.   Cardiovascular:      Rate and Rhythm: Normal rate and regular rhythm.      Pulses: Normal pulses.      Heart sounds: Normal heart sounds.   Pulmonary:      Effort: Pulmonary effort is normal.      Breath sounds: Normal breath sounds.   Chest:      Comments: Breast exam normal except dense breasts bilaterally   Abdominal:      General: Bowel sounds are normal.      Palpations: Abdomen is soft.   Musculoskeletal:         General: Normal range of motion.      Cervical back: Normal range of motion and neck supple.   Skin:     General: Skin is warm.   Neurological:      General: No focal deficit present.      Mental Status: She is alert and oriented to person, place, and time.   Psychiatric:         Mood and Affect: Mood normal.         Behavior: Behavior normal.         Thought Content: Thought content normal.         Judgment: Judgment normal.       Assessment/Plan   Problem List Items Addressed This Visit       Anxiety and depression    Body aches    Essential tremor    Hyperlipidemia, familial, high LDL     Other Visit Diagnoses         Healthcare maintenance    -  Primary    Relevant Orders    CBC    Comprehensive Metabolic Panel    TSH with reflex to Free T4 if abnormal      Visit for screening mammogram        Relevant Orders    BI mammo bilateral screening tomosynthesis      Need for lipid screening        Relevant Orders    Lipid Panel      Screening for diabetes mellitus        Relevant Orders    Hemoglobin A1c            Annual physical completed  Annual labs ordered         Health Maintenence completed  -  Discussed healthy diet and regular exercise.    -  Physical exam overall unremarkable. Immunizations reviewed and updated accordingly. Healthy lifestyle choices discussed  (tobacco avoidance, appropriate alcohol use, avoidance of illicit substances).   -  Patient is wearing seatbelt.   -  Screening lab work ordered as indicated.    -  Age appropriate screening tests reviewed with patient.        She is  with 2 sons. She has prev Hx of tobacco use, quit 2010   She worked for [employer]. She retired 5/2021    She helps take care of her parents and a friend who is blind   She will be going on Medicare next year 2026     She feels her hearing is not as sharp as it used to be      Her weight/ BMI is in normal range in office, recommend she maintain   Her weight is 122 pounds with BMI at 23.05 IO 6/25  She is playing pickle ball      BP in normal range in office. We will continue to monitor.  EKG normal sinus 6/23, no LVH or strain pattern   Recommend she monitor her BP periodically and call with elevated readings      I have spent 15 min face to face with this patient discussing their cardiac risk   We discussed the patients cardiovascular risk. If needed, lifestyle modifications recommended including: behavioral therapies of nutrition choices, exercise and eliminate habits that are contributing to their cardiac risk. We agreed to a plan to decrease his cardiovascular risks. Discussed ASA. Reviewed Guidelines and approved recommendations made to patient.   The patient's 10 yr CV risk was estimated at  3.4 % IO 6/25     HLD: Labs ordered and we will adjust if indicated  6/25  Explained this is familial hypercholesteremia  Explained goal for LDL to be less than 100 and ideally less than 70   Continue simvastatin 40 mg daily.     Ca cardiac score 3/21 was 63 in left main artery   Recommend she look into a plant based/whole foods diet      Familial tremors:   Her brother has a constant twitch and is getting Botox injections.   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  Continue venlafaxine 150 mg daily     LT wrist pain. She is back to playing pickle ball. She really likes the game and has made a lot of friends   Fell playing pickle ball. Xrays @  [facility] 07/17/22   - S/p open bone biopsy with normal findings.   - S/p open reduction/internal fixation to the left wrist with substantial stiffness.  - S/p open treatment of scapholunate ligament disruption, status post hardware removal 9-21-22.  She saw Dr Dawson in 1/2023     Right hand second finger has a cyst:   She just started wearing a glove when playing pickle ball  Her  told her she is holding her pickle ball racquet wrong      Hx of BCC to the skin   She has not seen dermatology in some time   Recommend an appt with dermatology 2/21 - she has not seen derm    Vitamin D def:  Continue OTC Vitamin D3 2000 UT daily   She does not take Vitamin D during summer months due to being in the sn      She saw Dr Godfrey in 4/24 and Pap was normal and no longer needs to repeat   She is no longer using Vagifem    She went through menopause in her late 40's- she had hot flashes for a year      Mammo normal 3/24 and ordered 6/25  US of breast negative in 5/19.   MRI of breasts in 11/2022 normal   Breast exam normal except dense breasts bilaterally 6/25     BD 3/24 T-1.9. Recommend she begin OTC calcium 600 mg BID, OTC Vitamin D 2000 UT daily and eat 2 servings of calcium enriched foods daily.  Recommend she add Mg glycinate 200 mg nightly   Discussed importance of weight bearing exercises      Colon 4/2021 fragments of serrated adenoma and Q 5   Her father had polyps and her brother had colon cancer   Her brother has tested positive for Miranda Syndrome but she tested negative       Ophth:    She wears OTC reading glasses. No glaucoma or MD. Her father has MD.   She will have her next eye exam faxed to my office in order to update her medical records.      Family Hx:  Breast cancer - mother, maternal aunt and MGM   Father had diverticulosis, CHF, HTN and breast cancer on his side of the family   Her father has MD.    Her father had polyps and her brother had colon cancer   Her brother has tested positive for Miranda Syndrome but she tested negative      Hep C 12/21-  Flu 10/21, 9/22, 11/23, 9/24  TDAP recommend updating 6/23, 6/24, 6/25  Prevnar never   Pneumo never   Shingrix 2/23/22 and 8/23/22  SARS-CoV-2- 4/21, 5/21, 1/22, 6/22, 9/22, 11/23, 9/24     Some elements in the chart were copied from Dr. Alvarez's last office visit with patient. Notes have been updated where appropriate, and reflect my current medical decision making from today.        Return in 1 year for CPE or sooner if needed. She will make her CR appt   (CPE due 6/26) - She will be going on Medicare next year 2026     Scribe Attestation  By signing my name below, I, Noni Harrison, Scribe attest that this documentation has been prepared under the direction and in the presence of Tammy Alvarez MD.

## 2025-06-26 ENCOUNTER — APPOINTMENT (OUTPATIENT)
Dept: PRIMARY CARE | Facility: CLINIC | Age: 65
End: 2025-06-26
Payer: COMMERCIAL

## 2025-06-26 VITALS
HEIGHT: 61 IN | HEART RATE: 64 BPM | TEMPERATURE: 96.6 F | OXYGEN SATURATION: 98 % | SYSTOLIC BLOOD PRESSURE: 110 MMHG | BODY MASS INDEX: 23.03 KG/M2 | DIASTOLIC BLOOD PRESSURE: 60 MMHG | WEIGHT: 122 LBS

## 2025-06-26 DIAGNOSIS — F41.9 ANXIETY AND DEPRESSION: ICD-10-CM

## 2025-06-26 DIAGNOSIS — E78.49 HYPERLIPIDEMIA, FAMILIAL, HIGH LDL: ICD-10-CM

## 2025-06-26 DIAGNOSIS — E78.9 DISORDER OF LIPOPROTEIN METABOLISM, UNSPECIFIED: ICD-10-CM

## 2025-06-26 DIAGNOSIS — Z00.00 HEALTHCARE MAINTENANCE: Primary | ICD-10-CM

## 2025-06-26 DIAGNOSIS — F32.A DEPRESSION, UNSPECIFIED: ICD-10-CM

## 2025-06-26 DIAGNOSIS — R93.1 ABNORMAL HEART SCORE CT: ICD-10-CM

## 2025-06-26 DIAGNOSIS — R52 BODY ACHES: ICD-10-CM

## 2025-06-26 DIAGNOSIS — F41.9 ANXIETY DISORDER, UNSPECIFIED: ICD-10-CM

## 2025-06-26 DIAGNOSIS — Z13.220 NEED FOR LIPID SCREENING: ICD-10-CM

## 2025-06-26 DIAGNOSIS — F32.A ANXIETY AND DEPRESSION: ICD-10-CM

## 2025-06-26 DIAGNOSIS — G25.0 ESSENTIAL TREMOR: ICD-10-CM

## 2025-06-26 DIAGNOSIS — Z13.1 SCREENING FOR DIABETES MELLITUS: ICD-10-CM

## 2025-06-26 DIAGNOSIS — Z12.31 VISIT FOR SCREENING MAMMOGRAM: ICD-10-CM

## 2025-06-26 PROCEDURE — 3008F BODY MASS INDEX DOCD: CPT | Performed by: INTERNAL MEDICINE

## 2025-06-26 PROCEDURE — 90715 TDAP VACCINE 7 YRS/> IM: CPT | Performed by: INTERNAL MEDICINE

## 2025-06-26 PROCEDURE — 90471 IMMUNIZATION ADMIN: CPT | Performed by: INTERNAL MEDICINE

## 2025-06-26 PROCEDURE — 1036F TOBACCO NON-USER: CPT | Performed by: INTERNAL MEDICINE

## 2025-06-26 PROCEDURE — 99396 PREV VISIT EST AGE 40-64: CPT | Performed by: INTERNAL MEDICINE

## 2025-06-26 RX ORDER — ROSUVASTATIN CALCIUM 20 MG/1
20 TABLET, COATED ORAL DAILY
Qty: 100 TABLET | Refills: 3 | Status: SHIPPED | OUTPATIENT
Start: 2025-06-26 | End: 2026-07-31

## 2025-06-26 RX ORDER — VENLAFAXINE HYDROCHLORIDE 150 MG/1
150 TABLET, EXTENDED RELEASE ORAL DAILY
Qty: 90 TABLET | Refills: 3 | Status: SHIPPED | OUTPATIENT
Start: 2025-06-26 | End: 2026-06-26

## 2025-06-26 ASSESSMENT — PATIENT HEALTH QUESTIONNAIRE - PHQ9
1. LITTLE INTEREST OR PLEASURE IN DOING THINGS: NOT AT ALL
2. FEELING DOWN, DEPRESSED OR HOPELESS: NOT AT ALL
SUM OF ALL RESPONSES TO PHQ9 QUESTIONS 1 AND 2: 0

## 2025-07-17 ENCOUNTER — APPOINTMENT (OUTPATIENT)
Dept: RADIOLOGY | Facility: HOSPITAL | Age: 65
End: 2025-07-17
Payer: COMMERCIAL

## 2025-08-11 ENCOUNTER — HOSPITAL ENCOUNTER (OUTPATIENT)
Dept: RADIOLOGY | Facility: HOSPITAL | Age: 65
Discharge: HOME | End: 2025-08-11
Payer: COMMERCIAL

## 2025-08-11 DIAGNOSIS — Z12.31 VISIT FOR SCREENING MAMMOGRAM: ICD-10-CM

## 2025-08-11 PROCEDURE — 77063 BREAST TOMOSYNTHESIS BI: CPT | Performed by: STUDENT IN AN ORGANIZED HEALTH CARE EDUCATION/TRAINING PROGRAM

## 2025-08-11 PROCEDURE — 77067 SCR MAMMO BI INCL CAD: CPT | Performed by: STUDENT IN AN ORGANIZED HEALTH CARE EDUCATION/TRAINING PROGRAM

## 2025-08-11 PROCEDURE — 77067 SCR MAMMO BI INCL CAD: CPT

## 2025-08-12 LAB
ALBUMIN SERPL-MCNC: 4.5 G/DL (ref 3.6–5.1)
ALP SERPL-CCNC: 80 U/L (ref 37–153)
ALT SERPL-CCNC: 14 U/L (ref 6–29)
ANION GAP SERPL CALCULATED.4IONS-SCNC: 7 MMOL/L (CALC) (ref 7–17)
AST SERPL-CCNC: 20 U/L (ref 10–35)
BILIRUB SERPL-MCNC: 0.6 MG/DL (ref 0.2–1.2)
BUN SERPL-MCNC: 19 MG/DL (ref 7–25)
CALCIUM SERPL-MCNC: 9.5 MG/DL (ref 8.6–10.4)
CHLORIDE SERPL-SCNC: 100 MMOL/L (ref 98–110)
CHOLEST SERPL-MCNC: 252 MG/DL
CHOLEST/HDLC SERPL: 3.4 (CALC)
CO2 SERPL-SCNC: 32 MMOL/L (ref 20–32)
CREAT SERPL-MCNC: 0.72 MG/DL (ref 0.5–1.05)
EGFRCR SERPLBLD CKD-EPI 2021: 93 ML/MIN/1.73M2
ERYTHROCYTE [DISTWIDTH] IN BLOOD BY AUTOMATED COUNT: 13.5 % (ref 11–15)
EST. AVERAGE GLUCOSE BLD GHB EST-MCNC: 103 MG/DL
EST. AVERAGE GLUCOSE BLD GHB EST-SCNC: 5.7 MMOL/L
GLUCOSE SERPL-MCNC: 91 MG/DL (ref 65–99)
HBA1C MFR BLD: 5.2 %
HCT VFR BLD AUTO: 39.1 % (ref 35–45)
HDLC SERPL-MCNC: 74 MG/DL
HGB BLD-MCNC: 12.9 G/DL (ref 11.7–15.5)
LDLC SERPL CALC-MCNC: 160 MG/DL (CALC)
MCH RBC QN AUTO: 31.2 PG (ref 27–33)
MCHC RBC AUTO-ENTMCNC: 33 G/DL (ref 32–36)
MCV RBC AUTO: 94.4 FL (ref 80–100)
NONHDLC SERPL-MCNC: 178 MG/DL (CALC)
PLATELET # BLD AUTO: 267 THOUSAND/UL (ref 140–400)
PMV BLD REES-ECKER: 9.2 FL (ref 7.5–12.5)
POTASSIUM SERPL-SCNC: 4.2 MMOL/L (ref 3.5–5.3)
PROT SERPL-MCNC: 6.5 G/DL (ref 6.1–8.1)
RBC # BLD AUTO: 4.14 MILLION/UL (ref 3.8–5.1)
SODIUM SERPL-SCNC: 139 MMOL/L (ref 135–146)
TRIGL SERPL-MCNC: 77 MG/DL
TSH SERPL-ACNC: 1.59 MIU/L (ref 0.4–4.5)
WBC # BLD AUTO: 6.7 THOUSAND/UL (ref 3.8–10.8)

## 2025-08-13 ENCOUNTER — APPOINTMENT (OUTPATIENT)
Dept: RADIOLOGY | Facility: HOSPITAL | Age: 65
End: 2025-08-13
Payer: COMMERCIAL

## 2026-06-29 ENCOUNTER — APPOINTMENT (OUTPATIENT)
Dept: PRIMARY CARE | Facility: CLINIC | Age: 66
End: 2026-06-29
Payer: COMMERCIAL

## (undated) DEVICE — ELECTRODE PT RET AD L9FT HI MOIST COND ADH HYDRGEL CORDED

## (undated) DEVICE — C-ARM: Brand: UNBRANDED

## (undated) DEVICE — GOWN,AURORA,NONRNF,XL,30/CS: Brand: MEDLINE

## (undated) DEVICE — SINGLE PORT MANIFOLD: Brand: NEPTUNE 2

## (undated) DEVICE — 1010 S-DRAPE TOWEL DRAPE 10/BX: Brand: STERI-DRAPE™

## (undated) DEVICE — BIT DRL L110MM DIA1.8MM QUIK CPL CALIB W/O STP REUSE

## (undated) DEVICE — SUTURE ETHLN SZ 3-0 L18IN NONABSORBABLE BLK PS-2 L19MM 3/8 1669H

## (undated) DEVICE — NEPTUNE E-SEP SMOKE EVACUATION PENCIL, COATED, 70MM BLADE, PUSH BUTTON SWITCH: Brand: NEPTUNE E-SEP

## (undated) DEVICE — DRESSING PETRO W3XL8IN OIL EMUL N ADH GZ KNIT IMPREG CELOS

## (undated) DEVICE — SYRINGE IRRIG 60ML SFT PLIABLE BLB EZ TO GRP 1 HND USE W/

## (undated) DEVICE — SPONGE GZ W4XL4IN COT 12 PLY TYP VII WVN C FLD DSGN

## (undated) DEVICE — TUBING, SUCTION, 1/4" X 10', STRAIGHT: Brand: MEDLINE

## (undated) DEVICE — GOWN,AURORA,NONREINFORCED,LARGE: Brand: MEDLINE

## (undated) DEVICE — BANDAGE,GAUZE,BULKEE II,4.5"X4.1YD,STRL: Brand: MEDLINE

## (undated) DEVICE — GLOVE ORTHO 7 1/2   MSG9475

## (undated) DEVICE — GLOVE ORANGE PI 7   MSG9070

## (undated) DEVICE — Device

## (undated) DEVICE — ZIMMER® STERILE DISPOSABLE TOURNIQUET CUFF, DUAL PORT, SINGLE BLADDER, 18 IN. (46 CM)

## (undated) DEVICE — PADDING UNDERCAST W4INXL12FT RAYON POLY SYN NONADHESIVE

## (undated) DEVICE — HAND II: Brand: MEDLINE INDUSTRIES, INC.

## (undated) DEVICE — SPONGE,LAP,18"X18",DLX,XR,ST,5/PK,40/PK: Brand: MEDLINE

## (undated) DEVICE — SUTURE ETHLN SZ 4-0 L18IN NONABSORBABLE BLK L19MM PS-2 3/8 1667H

## (undated) DEVICE — NEEDLE BNE ACCS SZ 3 11GA DMND FOR VERTPLSTY EXPR FRAC

## (undated) DEVICE — PADDING CAST W4INXL4YD HIGHLY ABSRB THAN COT EZ APPL

## (undated) DEVICE — GLOVE ORANGE PI 7 1/2   MSG9075

## (undated) DEVICE — APPLICATOR MEDICATED 26 CC SOLUTION HI LT ORNG CHLORAPREP

## (undated) DEVICE — WRAP COHESIVE W2INXL5YD TAN SELF ADH BNDG HND NON STERILE TEAR CARING

## (undated) DEVICE — BANDAGE COMPR W4INXL5YD WHT BGE POLY COT M E WRP WV HK AND

## (undated) DEVICE — SUTURE VCRL SZ 2-0 L27IN ABSRB UD L26MM CT-2 1/2 CIR J269H

## (undated) DEVICE — SPLINT CAST W4XL15IN GRN STRENGTH PLSTR OF PARIS FAST SET